# Patient Record
Sex: FEMALE | Race: WHITE | NOT HISPANIC OR LATINO | Employment: FULL TIME | URBAN - METROPOLITAN AREA
[De-identification: names, ages, dates, MRNs, and addresses within clinical notes are randomized per-mention and may not be internally consistent; named-entity substitution may affect disease eponyms.]

---

## 2017-09-19 PROBLEM — O99.891 BACK PAIN IN PREGNANCY: Status: ACTIVE | Noted: 2017-09-19

## 2017-09-19 PROBLEM — M54.9 BACK PAIN IN PREGNANCY: Status: ACTIVE | Noted: 2017-09-19

## 2017-11-13 ENCOUNTER — HOSPITAL ENCOUNTER (OUTPATIENT)
Facility: HOSPITAL | Age: 22
Discharge: HOME | End: 2017-11-13
Attending: OBSTETRICS & GYNECOLOGY | Admitting: OBSTETRICS & GYNECOLOGY
Payer: COMMERCIAL

## 2017-11-13 VITALS
HEIGHT: 60 IN | SYSTOLIC BLOOD PRESSURE: 96 MMHG | OXYGEN SATURATION: 98 % | DIASTOLIC BLOOD PRESSURE: 50 MMHG | HEART RATE: 78 BPM | TEMPERATURE: 99 F | RESPIRATION RATE: 18 BRPM

## 2017-11-13 DIAGNOSIS — G44.219 EPISODIC TENSION-TYPE HEADACHE, NOT INTRACTABLE: ICD-10-CM

## 2017-11-13 DIAGNOSIS — R51.9 HEADACHE: ICD-10-CM

## 2017-11-13 LAB
ABO + RH BLD: NORMAL
AMPHET+METHAMPHET UR QL: NEGATIVE
BARBITURATES UR QL SCN>200 NG/ML: NEGATIVE
BASOPHILS # BLD AUTO: 0.01 K/UL
BASOPHILS NFR BLD: 0.1 %
BENZODIAZ UR QL SCN>200 NG/ML: NEGATIVE
BLD GP AB SCN CELLS X3 SERPL QL: NORMAL
BZE UR QL SCN: NEGATIVE
CANNABINOIDS UR QL SCN: NEGATIVE
CREAT UR-MCNC: 42.3 MG/DL
DIFFERENTIAL METHOD: ABNORMAL
EOSINOPHIL # BLD AUTO: 0 K/UL
EOSINOPHIL NFR BLD: 0.4 %
ERYTHROCYTE [DISTWIDTH] IN BLOOD BY AUTOMATED COUNT: 15 %
HCT VFR BLD AUTO: 32.7 %
HGB BLD-MCNC: 10.4 G/DL
HIV1+2 IGG SERPL QL IA.RAPID: NEGATIVE
LYMPHOCYTES # BLD AUTO: 2.3 K/UL
LYMPHOCYTES NFR BLD: 23.9 %
MCH RBC QN AUTO: 26.1 PG
MCHC RBC AUTO-ENTMCNC: 31.8 G/DL
MCV RBC AUTO: 82 FL
METHADONE UR QL SCN>300 NG/ML: NEGATIVE
MONOCYTES # BLD AUTO: 0.5 K/UL
MONOCYTES NFR BLD: 5.1 %
NEUTROPHILS # BLD AUTO: 6.7 K/UL
NEUTROPHILS NFR BLD: 71.7 %
OPIATES UR QL SCN: NEGATIVE
PCP UR QL SCN>25 NG/ML: NEGATIVE
PLATELET # BLD AUTO: 239 K/UL
PMV BLD AUTO: 11.3 FL
RBC # BLD AUTO: 3.98 M/UL
TOXICOLOGY INFORMATION: NORMAL
WBC # BLD AUTO: 9.47 K/UL

## 2017-11-13 PROCEDURE — 87081 CULTURE SCREEN ONLY: CPT

## 2017-11-13 PROCEDURE — 86850 RBC ANTIBODY SCREEN: CPT

## 2017-11-13 PROCEDURE — 87340 HEPATITIS B SURFACE AG IA: CPT

## 2017-11-13 PROCEDURE — 59025 FETAL NON-STRESS TEST: CPT

## 2017-11-13 PROCEDURE — 63600175 PHARM REV CODE 636 W HCPCS: Performed by: ADVANCED PRACTICE MIDWIFE

## 2017-11-13 PROCEDURE — 99213 OFFICE O/P EST LOW 20 MIN: CPT | Mod: ,,, | Performed by: ADVANCED PRACTICE MIDWIFE

## 2017-11-13 PROCEDURE — 85025 COMPLETE CBC W/AUTO DIFF WBC: CPT

## 2017-11-13 PROCEDURE — 87591 N.GONORRHOEAE DNA AMP PROB: CPT

## 2017-11-13 PROCEDURE — 86592 SYPHILIS TEST NON-TREP QUAL: CPT

## 2017-11-13 PROCEDURE — 96372 THER/PROPH/DIAG INJ SC/IM: CPT

## 2017-11-13 PROCEDURE — 86703 HIV-1/HIV-2 1 RESULT ANTBDY: CPT

## 2017-11-13 PROCEDURE — 86900 BLOOD TYPING SEROLOGIC ABO: CPT

## 2017-11-13 PROCEDURE — 99211 OFF/OP EST MAY X REQ PHY/QHP: CPT | Mod: 25

## 2017-11-13 PROCEDURE — 80307 DRUG TEST PRSMV CHEM ANLYZR: CPT

## 2017-11-13 PROCEDURE — 86762 RUBELLA ANTIBODY: CPT

## 2017-11-13 PROCEDURE — 25000003 PHARM REV CODE 250: Performed by: ADVANCED PRACTICE MIDWIFE

## 2017-11-13 RX ORDER — ONDANSETRON 8 MG/1
8 TABLET, ORALLY DISINTEGRATING ORAL EVERY 8 HOURS PRN
Status: DISCONTINUED | OUTPATIENT
Start: 2017-11-13 | End: 2017-11-14 | Stop reason: HOSPADM

## 2017-11-13 RX ORDER — HYDROCODONE BITARTRATE AND ACETAMINOPHEN 7.5; 325 MG/1; MG/1
1 TABLET ORAL EVERY 6 HOURS PRN
Status: DISCONTINUED | OUTPATIENT
Start: 2017-11-13 | End: 2017-11-14 | Stop reason: HOSPADM

## 2017-11-13 RX ORDER — DIPHENHYDRAMINE HCL 25 MG
25 CAPSULE ORAL EVERY 6 HOURS PRN
Status: DISCONTINUED | OUTPATIENT
Start: 2017-11-13 | End: 2017-11-14 | Stop reason: HOSPADM

## 2017-11-13 RX ORDER — PROMETHAZINE HYDROCHLORIDE 25 MG/ML
12.5 INJECTION, SOLUTION INTRAMUSCULAR; INTRAVENOUS ONCE
Status: COMPLETED | OUTPATIENT
Start: 2017-11-13 | End: 2017-11-13

## 2017-11-13 RX ORDER — MORPHINE SULFATE 10 MG/ML
10 INJECTION INTRAMUSCULAR; INTRAVENOUS; SUBCUTANEOUS ONCE
Status: COMPLETED | OUTPATIENT
Start: 2017-11-13 | End: 2017-11-13

## 2017-11-13 RX ORDER — ACETAMINOPHEN 500 MG
500 TABLET ORAL EVERY 6 HOURS PRN
Status: DISCONTINUED | OUTPATIENT
Start: 2017-11-13 | End: 2017-11-14 | Stop reason: HOSPADM

## 2017-11-13 RX ORDER — BUTALBITAL, ACETAMINOPHEN AND CAFFEINE 50; 325; 40 MG/1; MG/1; MG/1
1 TABLET ORAL EVERY 4 HOURS PRN
Status: DISCONTINUED | OUTPATIENT
Start: 2017-11-13 | End: 2017-11-14 | Stop reason: HOSPADM

## 2017-11-13 RX ADMIN — MORPHINE SULFATE 10 MG: 10 INJECTION, SOLUTION INTRAMUSCULAR; INTRAVENOUS at 10:11

## 2017-11-13 RX ADMIN — HYDROCODONE BITARTRATE AND ACETAMINOPHEN 1 TABLET: 7.5; 325 TABLET ORAL at 09:11

## 2017-11-13 RX ADMIN — ONDANSETRON 8 MG: 8 TABLET, ORALLY DISINTEGRATING ORAL at 10:11

## 2017-11-13 RX ADMIN — PROMETHAZINE HYDROCHLORIDE 12.5 MG: 25 INJECTION INTRAMUSCULAR; INTRAVENOUS at 07:11

## 2017-11-13 RX ADMIN — DIPHENHYDRAMINE HYDROCHLORIDE 25 MG: 25 CAPSULE ORAL at 10:11

## 2017-11-13 RX ADMIN — BUTALBITAL, ACETAMINOPHEN, AND CAFFEINE 1 TABLET: 50; 325; 40 TABLET ORAL at 07:11

## 2017-11-14 LAB
C TRACH DNA SPEC QL NAA+PROBE: NOT DETECTED
N GONORRHOEA DNA SPEC QL NAA+PROBE: NOT DETECTED

## 2017-11-14 NOTE — H&P
[facility] -   Obstetrics  History & Physical    Patient Name: Lizbeth Barbosa  MRN: [id number]  Admission Date: 2017  Primary Care Provider: Susie Kim MD    Subjective:     Principal Problem:<principal problem not specified>    History of Present Illness:   IUP at 35w6d by sono done in [facility] ER  No prenatal care this pregnancy    Obstetric HPI:  Patient reports None contractions, active fetal movement, No vaginal bleeding , No loss of fluid     This pregnancy has been complicated by absent prenatal care, back pain    Obstetric History       T1      L3     SAB1   TAB0   Ectopic0   Multiple0   Live Births3       # Outcome Date GA Lbr Mendoza/2nd Weight Sex Delivery Anes PTL Lv   4 Current            3 Term 14 40w5d  3.711 kg (8 lb 2.9 oz)   EPI N SEBASTIAN      Complications: , delivered      Apgar1:  8                Apgar5: 9   2 SAB 14 8w0d       DEC   1 Term 13 38w0d  2.863 kg (6 lb 5 oz) F CS-Unspec   SEBASTIAN        Past Medical History:   Diagnosis Date    ADHD (attention deficit hyperactivity disorder)     Anemia 10/17/2014     Past Surgical History:   Procedure Laterality Date    adnoids       SECTION      x 1    TONSILLECTOMY      tonsils         No prescriptions prior to admission.       Review of patient's allergies indicates:   Allergen Reactions    Latex, natural rubber Rash        Family History     Problem Relation (Age of Onset)    ADD / ADHD Sister, Sister    No Known Problems Mother, Father, Daughter, Sister, Daughter        Social History Main Topics    Smoking status: Current Every Day Smoker     Packs/day: 1.00     Years: 2.00     Types: Cigarettes    Smokeless tobacco: Never Used    Alcohol use Yes      Comment: occ    Drug use: No    Sexual activity: Yes     Partners: Male     Birth control/ protection: OCP     Review of Systems   Constitutional: Negative.    HENT: Negative.    Eyes: Positive for visual  disturbance.   Respiratory: Negative.    Cardiovascular: Negative.    Gastrointestinal: Negative.    Endocrine: Negative.    Genitourinary: Negative.    Musculoskeletal: Negative.    Skin:  Negative.   Neurological: Positive for headaches.   Hematological: Negative.    Psychiatric/Behavioral: Negative.    Breast: negative.    All other systems reviewed and are negative.     Objective:     Vital Signs (Most Recent):    Vital Signs (24h Range):           There is no height or weight on file to calculate BMI.    FHT: Cat 1 (reassuring)  TOCO:  Q 30 minutes    Physical Exam:   Constitutional: She is oriented to person, place, and time. She appears well-developed and well-nourished.     Eyes: Conjunctivae are normal. Pupils are equal, round, and reactive to light.    Neck: Normal range of motion.    Cardiovascular: Normal rate and regular rhythm.     Pulmonary/Chest: Breath sounds normal.        Abdominal: Soft.     Genitourinary: Vagina normal and uterus normal.           Musculoskeletal: Normal range of motion and moves all extremeties.       Neurological: She is alert and oriented to person, place, and time.    Skin: Skin is warm.    Psychiatric: She has a normal mood and affect. Her behavior is normal. Thought content normal.       Cervix:  Dilation:  1  Effacement:  50%  Station: -2  Presentation: Vertex     Significant Labs:  Lab Results   Component Value Date    GROUPTRH O POS 2014    HEPBSAG Negative 2014    STREPBCULT STREPTOCOCCUS AGALACTIAE (GROUP B) 10/30/2014       I have personallly reviewed all pertinent lab results from the last 24 hours.    Assessment/Plan:     22 y.o. female  at 35w6d for:    Headache    Prenatal labs ordered  GBS and genprobe collected  No prenatal care  P medicate with fioricet  observe            Maira Toure CNM  Obstetrics  [facility]

## 2017-11-14 NOTE — ASSESSMENT & PLAN NOTE
Prenatal labs ordered  GBS and genprobe collected  No prenatal care  P medicated with fioricet, norco and morphine  Will discharge home  Prenatal NOB appointment this week

## 2017-11-14 NOTE — SUBJECTIVE & OBJECTIVE
Obstetric HPI:  Patient reports None contractions, active fetal movement, No vaginal bleeding , No loss of fluid     This pregnancy has been complicated by absent prenatal care, back pain    Obstetric History       T1      L3     SAB1   TAB0   Ectopic0   Multiple0   Live Births3       # Outcome Date GA Lbr Mendoza/2nd Weight Sex Delivery Anes PTL Lv   4 Current            3 Term 14 40w5d  3.711 kg (8 lb 2.9 oz)   EPI N SEBASTIAN      Complications: , delivered      Apgar1:  8                Apgar5: 9   2 SAB 14 8w0d       DEC   1 Term 13 38w0d  2.863 kg (6 lb 5 oz) F CS-Unspec   SEBASTIAN        Past Medical History:   Diagnosis Date    ADHD (attention deficit hyperactivity disorder)     Anemia 10/17/2014     Past Surgical History:   Procedure Laterality Date    adnoids       SECTION      x 1    TONSILLECTOMY      tonsils         No prescriptions prior to admission.       Review of patient's allergies indicates:   Allergen Reactions    Latex, natural rubber Rash        Family History     Problem Relation (Age of Onset)    ADD / ADHD Sister, Sister    No Known Problems Mother, Father, Daughter, Sister, Daughter        Social History Main Topics    Smoking status: Current Every Day Smoker     Packs/day: 1.00     Years: 2.00     Types: Cigarettes    Smokeless tobacco: Never Used    Alcohol use Yes      Comment: occ    Drug use: No    Sexual activity: Yes     Partners: Male     Birth control/ protection: OCP     Review of Systems   Constitutional: Negative.    HENT: Negative.    Eyes: Positive for visual disturbance.   Respiratory: Negative.    Cardiovascular: Negative.    Gastrointestinal: Negative.    Endocrine: Negative.    Genitourinary: Negative.    Musculoskeletal: Negative.    Skin:  Negative.   Neurological: Positive for headaches.   Hematological: Negative.    Psychiatric/Behavioral: Negative.    Breast: negative.    All other systems reviewed and are negative.      Objective:     Vital Signs (Most Recent):    Vital Signs (24h Range):           There is no height or weight on file to calculate BMI.    FHT: Cat 1 (reassuring)  TOCO:  Q 30 minutes    Physical Exam:   Constitutional: She is oriented to person, place, and time. She appears well-developed and well-nourished.     Eyes: Conjunctivae are normal. Pupils are equal, round, and reactive to light.    Neck: Normal range of motion.    Cardiovascular: Normal rate and regular rhythm.     Pulmonary/Chest: Breath sounds normal.        Abdominal: Soft.     Genitourinary: Vagina normal and uterus normal.           Musculoskeletal: Normal range of motion and moves all extremeties.       Neurological: She is alert and oriented to person, place, and time.    Skin: Skin is warm.    Psychiatric: She has a normal mood and affect. Her behavior is normal. Thought content normal.       Cervix:  Dilation:  1  Effacement:  50%  Station: -2  Presentation: Vertex     Significant Labs:  Lab Results   Component Value Date    GROUPTRH O POS 12/04/2014    HEPBSAG Negative 04/17/2014    STREPBCULT STREPTOCOCCUS AGALACTIAE (GROUP B) 10/30/2014       I have personallly reviewed all pertinent lab results from the last 24 hours.

## 2017-11-14 NOTE — ASSESSMENT & PLAN NOTE
Prenatal labs ordered  GBS and genprobe collected  No prenatal care  P medicate with fioricet  observe

## 2017-11-14 NOTE — NURSING
Gave patient AVS and educated on  discharge information. Educated on nutrition, hydration, home medications, fetal kick counts, activity, s/s of OB emergencies, and reasons to notify OB provider (including vaginal bleeding like a period, rupture of membranes, constant and strong abdominal pain or tenderness that does not go away, contractions every 3-5 min for 1-2 hours that are increasing in strength, changes in urination such as hematuria/oliguria/dysuria/urgency/frequency, temp greater than 100.4 F). Patient verbalized understanding.    Gave phone number for clinic. Instructed to call in the morning to schedule appointment. Needs to establish care as a new OB. Verbalized understanding     Gave phone number for L&D for any further questions.

## 2017-11-14 NOTE — DISCHARGE SUMMARY
[facility]  Obstetrics  Discharge Summary      Patient Name: Lizbeth Barbosa  MRN: [id number]  Admission Date: 2017  Hospital Length of Stay: 0 days  Discharge Date and Time:  2017 10:25 PM  Attending Physician: Jodee Velasquez MD   Discharging Provider: Maira Toure CNM  Primary Care Provider: Susie Kim MD    HPI:  IUP at 35w6d by sono done in [facility]  No prenatal care this pregnancy    * No surgery found *     Hospital Course:   Presents c/o headache, itching and blurred vision        Final Active Diagnoses:    Diagnosis Date Noted POA    PRINCIPAL PROBLEM:  Headache [R51] 2017 Yes      Problems Resolved During this Admission:    Diagnosis Date Noted Date Resolved POA        Labs:   CBC   Recent Labs  Lab 17   WBC 9.47   HGB 10.4*   HCT 32.7*          Feeding Method: breast    Immunizations     None          This patient has no babies on file.  Pending Diagnostic Studies:     Procedure Component Value Units Date/Time    Hepatitis B Surface Antigen [[id number]] Collected:  17    Order Status:  Sent Lab Status:  In process Updated:  17    Specimen:  Blood from Blood     RPR [[id number]] Collected:  17    Order Status:  Sent Lab Status:  In process Updated:  17    Specimen:  Blood from Blood     Rubella antibody, IgG [[id number]] Collected:  17    Order Status:  Sent Lab Status:  In process Updated:  17    Specimen:  Blood from Blood           Discharged Condition: good    Disposition: Home or Self Care    Follow Up:  Follow-up Information     Follow up In 1 week.               Patient Instructions:     Diet general     Activity as tolerated       Medications:  There are no discharge medications for this patient.      Maira Toure CNM  Obstetrics  [facility]

## 2017-11-15 ENCOUNTER — INITIAL PRENATAL (OUTPATIENT)
Dept: OBSTETRICS AND GYNECOLOGY | Facility: CLINIC | Age: 22
End: 2017-11-15
Payer: COMMERCIAL

## 2017-11-15 VITALS
WEIGHT: 185.63 LBS | SYSTOLIC BLOOD PRESSURE: 102 MMHG | BODY MASS INDEX: 36.25 KG/M2 | DIASTOLIC BLOOD PRESSURE: 78 MMHG

## 2017-11-15 DIAGNOSIS — Z34.93 NORMAL PREGNANCY IN THIRD TRIMESTER: ICD-10-CM

## 2017-11-15 DIAGNOSIS — O09.33 INSUFFICIENT PRENATAL CARE IN THIRD TRIMESTER: Primary | ICD-10-CM

## 2017-11-15 DIAGNOSIS — Z98.891 PREVIOUS CESAREAN SECTION: ICD-10-CM

## 2017-11-15 LAB
HBV SURFACE AG SERPL QL IA: NEGATIVE
RPR SER QL: NORMAL

## 2017-11-15 PROCEDURE — 99999 PR PBB SHADOW E&M-EST. PATIENT-LVL II: CPT | Mod: PBBFAC,,, | Performed by: ADVANCED PRACTICE MIDWIFE

## 2017-11-15 PROCEDURE — 0500F INITIAL PRENATAL CARE VISIT: CPT | Mod: S$GLB,,, | Performed by: ADVANCED PRACTICE MIDWIFE

## 2017-11-15 RX ORDER — BUTALBITAL, ACETAMINOPHEN AND CAFFEINE 50; 325; 40 MG/1; MG/1; MG/1
1 TABLET ORAL EVERY 4 HOURS PRN
Qty: 30 TABLET | Refills: 1 | Status: SHIPPED | OUTPATIENT
Start: 2017-11-15 | End: 2017-12-15

## 2017-11-15 NOTE — PROGRESS NOTES
No prenatal care until ER visit Had sono x2 on at gender reveal in [facility] and one at ER in [facility]  Labs all done in triage will get sono next week  Has had headache on and off for 3 days responded to benadryl and morphine  Will rx fioricet, zyrtec  Oriented to practice   Coffective counseling sheet Fall In Love discussed with mother. Reinforced immediate skin to skin, the magic first hour, importance of the first feeding and delaying routine procedures. Encouraged mother to download Coffective mobile marely if she has not already done so. Mother verbalizes understanding.  Coffective counseling sheet Protect Breastfeeding discussed with mother. Reinforced avoidence of artifical nipples and formula, unless medically indicated.  Encouraged mother to download Coffective mobile marely if she has not already done so. Mother verbalizes understanding.

## 2017-11-17 LAB — BACTERIA SPEC AEROBE CULT: NORMAL

## 2017-11-20 ENCOUNTER — PROCEDURE VISIT (OUTPATIENT)
Dept: OBSTETRICS AND GYNECOLOGY | Facility: CLINIC | Age: 22
End: 2017-11-20
Payer: COMMERCIAL

## 2017-11-20 ENCOUNTER — ROUTINE PRENATAL (OUTPATIENT)
Dept: OBSTETRICS AND GYNECOLOGY | Facility: CLINIC | Age: 22
End: 2017-11-20
Payer: COMMERCIAL

## 2017-11-20 VITALS
SYSTOLIC BLOOD PRESSURE: 124 MMHG | WEIGHT: 185.19 LBS | DIASTOLIC BLOOD PRESSURE: 68 MMHG | BODY MASS INDEX: 36.17 KG/M2

## 2017-11-20 DIAGNOSIS — Z34.93 NORMAL PREGNANCY IN THIRD TRIMESTER: Primary | ICD-10-CM

## 2017-11-20 DIAGNOSIS — O09.33 INSUFFICIENT PRENATAL CARE IN THIRD TRIMESTER: ICD-10-CM

## 2017-11-20 PROCEDURE — 76819 FETAL BIOPHYS PROFIL W/O NST: CPT | Mod: S$GLB,,, | Performed by: OBSTETRICS & GYNECOLOGY

## 2017-11-20 PROCEDURE — 99999 PR PBB SHADOW E&M-EST. PATIENT-LVL II: CPT | Mod: PBBFAC,,, | Performed by: ADVANCED PRACTICE MIDWIFE

## 2017-11-20 PROCEDURE — 76816 OB US FOLLOW-UP PER FETUS: CPT | Mod: S$GLB,,, | Performed by: OBSTETRICS & GYNECOLOGY

## 2017-11-20 PROCEDURE — 0502F SUBSEQUENT PRENATAL CARE: CPT | Mod: S$GLB,,, | Performed by: ADVANCED PRACTICE MIDWIFE

## 2017-11-20 NOTE — PROGRESS NOTES
sono shows EDC 12/22/17 but had early sono and sure of LMP will leave due date of 12/12/17  EFW 6#7oz  MVP 4  Kick counts

## 2017-11-21 LAB — MAYO MISCELLANEOUS RESULT (REF): NORMAL

## 2017-11-24 LAB
RUBV IGG SER-ACNC: NORMAL IU/ML
RUBV IGG SER-IMP: NORMAL

## 2017-12-04 ENCOUNTER — ROUTINE PRENATAL (OUTPATIENT)
Dept: OBSTETRICS AND GYNECOLOGY | Facility: CLINIC | Age: 22
End: 2017-12-04
Payer: COMMERCIAL

## 2017-12-04 VITALS — DIASTOLIC BLOOD PRESSURE: 62 MMHG | WEIGHT: 186.5 LBS | SYSTOLIC BLOOD PRESSURE: 130 MMHG | BODY MASS INDEX: 36.43 KG/M2

## 2017-12-04 DIAGNOSIS — Z34.93 NORMAL PREGNANCY IN THIRD TRIMESTER: Primary | ICD-10-CM

## 2017-12-04 PROCEDURE — 99999 PR PBB SHADOW E&M-EST. PATIENT-LVL II: CPT | Mod: PBBFAC,,, | Performed by: ADVANCED PRACTICE MIDWIFE

## 2017-12-04 PROCEDURE — 0502F SUBSEQUENT PRENATAL CARE: CPT | Mod: S$GLB,,, | Performed by: ADVANCED PRACTICE MIDWIFE

## 2017-12-11 ENCOUNTER — ROUTINE PRENATAL (OUTPATIENT)
Dept: OBSTETRICS AND GYNECOLOGY | Facility: CLINIC | Age: 22
End: 2017-12-11
Payer: COMMERCIAL

## 2017-12-11 VITALS — BODY MASS INDEX: 37.2 KG/M2 | SYSTOLIC BLOOD PRESSURE: 122 MMHG | DIASTOLIC BLOOD PRESSURE: 68 MMHG | WEIGHT: 190.5 LBS

## 2017-12-11 DIAGNOSIS — Z34.93 NORMAL PREGNANCY IN THIRD TRIMESTER: Primary | ICD-10-CM

## 2017-12-11 DIAGNOSIS — Z98.891 PREVIOUS CESAREAN SECTION: ICD-10-CM

## 2017-12-11 PROCEDURE — 99999 PR PBB SHADOW E&M-EST. PATIENT-LVL II: CPT | Mod: PBBFAC,,, | Performed by: ADVANCED PRACTICE MIDWIFE

## 2017-12-11 PROCEDURE — 0502F SUBSEQUENT PRENATAL CARE: CPT | Mod: S$GLB,,, | Performed by: ADVANCED PRACTICE MIDWIFE

## 2017-12-12 ENCOUNTER — HOSPITAL ENCOUNTER (INPATIENT)
Facility: HOSPITAL | Age: 22
LOS: 2 days | Discharge: HOME | End: 2017-12-14
Attending: OBSTETRICS & GYNECOLOGY | Admitting: OBSTETRICS & GYNECOLOGY
Payer: COMMERCIAL

## 2017-12-12 ENCOUNTER — ANESTHESIA (OUTPATIENT)
Dept: OBSTETRICS AND GYNECOLOGY | Facility: HOSPITAL | Age: 22
End: 2017-12-12
Payer: COMMERCIAL

## 2017-12-12 ENCOUNTER — ANESTHESIA EVENT (OUTPATIENT)
Dept: OBSTETRICS AND GYNECOLOGY | Facility: HOSPITAL | Age: 22
End: 2017-12-12
Payer: COMMERCIAL

## 2017-12-12 DIAGNOSIS — Z37.9 NORMAL LABOR: ICD-10-CM

## 2017-12-12 DIAGNOSIS — O34.219 VBAC, DELIVERED, CURRENT HOSPITALIZATION: ICD-10-CM

## 2017-12-12 DIAGNOSIS — O47.9 IRREGULAR UTERINE CONTRACTIONS: ICD-10-CM

## 2017-12-12 PROBLEM — O09.30 LATE PRENATAL CARE: Status: RESOLVED | Noted: 2017-12-12 | Resolved: 2017-12-12

## 2017-12-12 PROBLEM — M54.9 BACK PAIN IN PREGNANCY: Status: RESOLVED | Noted: 2017-09-19 | Resolved: 2017-12-12

## 2017-12-12 PROBLEM — O09.30 LATE PRENATAL CARE: Status: ACTIVE | Noted: 2017-12-12

## 2017-12-12 PROBLEM — R51.9 HEADACHE: Status: RESOLVED | Noted: 2017-11-13 | Resolved: 2017-12-12

## 2017-12-12 PROBLEM — O99.891 BACK PAIN IN PREGNANCY: Status: RESOLVED | Noted: 2017-09-19 | Resolved: 2017-12-12

## 2017-12-12 LAB
ABO + RH BLD: NORMAL
BASOPHILS # BLD AUTO: 0.01 K/UL
BASOPHILS NFR BLD: 0.1 %
BLD GP AB SCN CELLS X3 SERPL QL: NORMAL
DIFFERENTIAL METHOD: ABNORMAL
EOSINOPHIL # BLD AUTO: 0.1 K/UL
EOSINOPHIL NFR BLD: 0.6 %
ERYTHROCYTE [DISTWIDTH] IN BLOOD BY AUTOMATED COUNT: 16.3 %
HCT VFR BLD AUTO: 31.3 %
HGB BLD-MCNC: 9.5 G/DL
LYMPHOCYTES # BLD AUTO: 2.5 K/UL
LYMPHOCYTES NFR BLD: 19.5 %
MCH RBC QN AUTO: 24.7 PG
MCHC RBC AUTO-ENTMCNC: 30.4 G/DL
MCV RBC AUTO: 81 FL
MONOCYTES # BLD AUTO: 0.7 K/UL
MONOCYTES NFR BLD: 5.8 %
NEUTROPHILS # BLD AUTO: 9.4 K/UL
NEUTROPHILS NFR BLD: 74.5 %
PLATELET # BLD AUTO: 179 K/UL
PMV BLD AUTO: 12.7 FL
RBC # BLD AUTO: 3.85 M/UL
WBC # BLD AUTO: 12.65 K/UL

## 2017-12-12 PROCEDURE — 0HQ9XZZ REPAIR PERINEUM SKIN, EXTERNAL APPROACH: ICD-10-PCS | Performed by: OBSTETRICS & GYNECOLOGY

## 2017-12-12 PROCEDURE — 63600175 PHARM REV CODE 636 W HCPCS: Performed by: NURSE ANESTHETIST, CERTIFIED REGISTERED

## 2017-12-12 PROCEDURE — 86850 RBC ANTIBODY SCREEN: CPT

## 2017-12-12 PROCEDURE — 11000001 HC ACUTE MED/SURG PRIVATE ROOM

## 2017-12-12 PROCEDURE — 25000003 PHARM REV CODE 250: Performed by: MIDWIFE

## 2017-12-12 PROCEDURE — 25000003 PHARM REV CODE 250: Performed by: ADVANCED PRACTICE MIDWIFE

## 2017-12-12 PROCEDURE — 99211 OFF/OP EST MAY X REQ PHY/QHP: CPT | Mod: 25

## 2017-12-12 PROCEDURE — 96361 HYDRATE IV INFUSION ADD-ON: CPT

## 2017-12-12 PROCEDURE — 62326 NJX INTERLAMINAR LMBR/SAC: CPT | Performed by: NURSE ANESTHETIST, CERTIFIED REGISTERED

## 2017-12-12 PROCEDURE — 72200005 HC VAGINAL DELIVERY LEVEL II

## 2017-12-12 PROCEDURE — 25000003 PHARM REV CODE 250: Performed by: NURSE ANESTHETIST, CERTIFIED REGISTERED

## 2017-12-12 PROCEDURE — 72100002 HC LABOR CARE, 1ST 8 HOURS

## 2017-12-12 PROCEDURE — 59025 FETAL NON-STRESS TEST: CPT

## 2017-12-12 PROCEDURE — 63600175 PHARM REV CODE 636 W HCPCS: Performed by: MIDWIFE

## 2017-12-12 PROCEDURE — 51701 INSERT BLADDER CATHETER: CPT

## 2017-12-12 PROCEDURE — 96374 THER/PROPH/DIAG INJ IV PUSH: CPT

## 2017-12-12 PROCEDURE — 96360 HYDRATION IV INFUSION INIT: CPT

## 2017-12-12 PROCEDURE — 10907ZC DRAINAGE OF AMNIOTIC FLUID, THERAPEUTIC FROM PRODUCTS OF CONCEPTION, VIA NATURAL OR ARTIFICIAL OPENING: ICD-10-PCS | Performed by: OBSTETRICS & GYNECOLOGY

## 2017-12-12 PROCEDURE — 85025 COMPLETE CBC W/AUTO DIFF WBC: CPT

## 2017-12-12 PROCEDURE — 27800517 HC TRAY,EPIDURAL-CONTINUOUS: Performed by: NURSE ANESTHETIST, CERTIFIED REGISTERED

## 2017-12-12 PROCEDURE — 86900 BLOOD TYPING SEROLOGIC ABO: CPT

## 2017-12-12 RX ORDER — HYDROCODONE BITARTRATE AND ACETAMINOPHEN 5; 325 MG/1; MG/1
1 TABLET ORAL EVERY 4 HOURS PRN
Status: DISCONTINUED | OUTPATIENT
Start: 2017-12-12 | End: 2017-12-14 | Stop reason: HOSPADM

## 2017-12-12 RX ORDER — HETASTARCH 6 G/100ML
500 INJECTION, SOLUTION INTRAVENOUS ONCE
Status: COMPLETED | OUTPATIENT
Start: 2017-12-12 | End: 2017-12-12

## 2017-12-12 RX ORDER — ONDANSETRON 8 MG/1
8 TABLET, ORALLY DISINTEGRATING ORAL EVERY 8 HOURS PRN
Status: DISCONTINUED | OUTPATIENT
Start: 2017-12-12 | End: 2017-12-12

## 2017-12-12 RX ORDER — ONDANSETRON 4 MG/1
4 TABLET, ORALLY DISINTEGRATING ORAL ONCE
Status: DISCONTINUED | OUTPATIENT
Start: 2017-12-12 | End: 2017-12-12

## 2017-12-12 RX ORDER — ROPIVACAINE IN 0.9% SOD CHL/PF 0.2 %
PLASTIC BAG, INJECTION (ML) EPIDURAL CONTINUOUS
Status: DISCONTINUED | OUTPATIENT
Start: 2017-12-12 | End: 2017-12-14 | Stop reason: HOSPADM

## 2017-12-12 RX ORDER — SODIUM CITRATE AND CITRIC ACID MONOHYDRATE 334; 500 MG/5ML; MG/5ML
30 SOLUTION ORAL ONCE
Status: DISCONTINUED | OUTPATIENT
Start: 2017-12-12 | End: 2017-12-14 | Stop reason: HOSPADM

## 2017-12-12 RX ORDER — ACETAMINOPHEN 325 MG/1
650 TABLET ORAL EVERY 6 HOURS PRN
Status: DISCONTINUED | OUTPATIENT
Start: 2017-12-12 | End: 2017-12-14 | Stop reason: HOSPADM

## 2017-12-12 RX ORDER — ACETAMINOPHEN 500 MG
500 TABLET ORAL EVERY 6 HOURS PRN
Status: DISCONTINUED | OUTPATIENT
Start: 2017-12-12 | End: 2017-12-12

## 2017-12-12 RX ORDER — AMMONIA 15 % (W/V)
0.3 AMPUL (EA) INHALATION CONTINUOUS PRN
Status: DISCONTINUED | OUTPATIENT
Start: 2017-12-12 | End: 2017-12-14 | Stop reason: HOSPADM

## 2017-12-12 RX ORDER — ROPIVACAINE HYDROCHLORIDE 2 MG/ML
INJECTION, SOLUTION EPIDURAL; INFILTRATION; PERINEURAL
Status: DISCONTINUED | OUTPATIENT
Start: 2017-12-12 | End: 2017-12-12

## 2017-12-12 RX ORDER — SODIUM CHLORIDE AND POTASSIUM CHLORIDE 150; 900 MG/100ML; MG/100ML
INJECTION, SOLUTION INTRAVENOUS CONTINUOUS
Status: DISCONTINUED | OUTPATIENT
Start: 2017-12-12 | End: 2017-12-12

## 2017-12-12 RX ORDER — DIPHENHYDRAMINE HCL 25 MG
25 CAPSULE ORAL EVERY 4 HOURS PRN
Status: DISCONTINUED | OUTPATIENT
Start: 2017-12-12 | End: 2017-12-14 | Stop reason: HOSPADM

## 2017-12-12 RX ORDER — ONDANSETRON 8 MG/1
8 TABLET, ORALLY DISINTEGRATING ORAL EVERY 8 HOURS PRN
Status: DISCONTINUED | OUTPATIENT
Start: 2017-12-12 | End: 2017-12-14 | Stop reason: HOSPADM

## 2017-12-12 RX ORDER — SODIUM CHLORIDE, SODIUM LACTATE, POTASSIUM CHLORIDE, CALCIUM CHLORIDE 600; 310; 30; 20 MG/100ML; MG/100ML; MG/100ML; MG/100ML
INJECTION, SOLUTION INTRAVENOUS CONTINUOUS
Status: DISCONTINUED | OUTPATIENT
Start: 2017-12-12 | End: 2017-12-12

## 2017-12-12 RX ORDER — IBUPROFEN 600 MG/1
600 TABLET ORAL EVERY 6 HOURS
Status: DISCONTINUED | OUTPATIENT
Start: 2017-12-12 | End: 2017-12-14 | Stop reason: HOSPADM

## 2017-12-12 RX ORDER — ROPIVACAINE HYDROCHLORIDE 2 MG/ML
INJECTION, SOLUTION EPIDURAL; INFILTRATION; PERINEURAL CONTINUOUS PRN
Status: DISCONTINUED | OUTPATIENT
Start: 2017-12-12 | End: 2017-12-12

## 2017-12-12 RX ORDER — DOCUSATE SODIUM 100 MG/1
100 CAPSULE, LIQUID FILLED ORAL DAILY
Status: DISCONTINUED | OUTPATIENT
Start: 2017-12-13 | End: 2017-12-14 | Stop reason: HOSPADM

## 2017-12-12 RX ORDER — OXYTOCIN/RINGER'S LACTATE 20/1000 ML
99 PLASTIC BAG, INJECTION (ML) INTRAVENOUS CONTINUOUS
Status: DISPENSED | OUTPATIENT
Start: 2017-12-12 | End: 2017-12-13

## 2017-12-12 RX ORDER — BUTORPHANOL TARTRATE 1 MG/ML
1 INJECTION INTRAMUSCULAR; INTRAVENOUS ONCE
Status: COMPLETED | OUTPATIENT
Start: 2017-12-12 | End: 2017-12-12

## 2017-12-12 RX ORDER — FAMOTIDINE 10 MG/ML
20 INJECTION INTRAVENOUS ONCE
Status: DISCONTINUED | OUTPATIENT
Start: 2017-12-12 | End: 2017-12-14 | Stop reason: HOSPADM

## 2017-12-12 RX ORDER — HYDROCORTISONE 25 MG/G
CREAM TOPICAL 3 TIMES DAILY PRN
Status: DISCONTINUED | OUTPATIENT
Start: 2017-12-12 | End: 2017-12-14 | Stop reason: HOSPADM

## 2017-12-12 RX ORDER — LIDOCAINE HYDROCHLORIDE AND EPINEPHRINE 15; 5 MG/ML; UG/ML
INJECTION, SOLUTION EPIDURAL
Status: DISCONTINUED | OUTPATIENT
Start: 2017-12-12 | End: 2017-12-12

## 2017-12-12 RX ADMIN — BUTORPHANOL TARTRATE 1 MG: 1 INJECTION, SOLUTION INTRAMUSCULAR; INTRAVENOUS at 09:12

## 2017-12-12 RX ADMIN — ONDANSETRON 8 MG: 8 TABLET, ORALLY DISINTEGRATING ORAL at 02:12

## 2017-12-12 RX ADMIN — IBUPROFEN 600 MG: 600 TABLET, FILM COATED ORAL at 11:12

## 2017-12-12 RX ADMIN — Medication 333 MILLI-UNITS/MIN: at 05:12

## 2017-12-12 RX ADMIN — IBUPROFEN 600 MG: 600 TABLET, FILM COATED ORAL at 06:12

## 2017-12-12 RX ADMIN — HETASTARCH 250 ML: 6 INJECTION, SOLUTION INTRAVENOUS at 02:12

## 2017-12-12 RX ADMIN — SODIUM CHLORIDE, SODIUM LACTATE, POTASSIUM CHLORIDE, AND CALCIUM CHLORIDE: 600; 310; 30; 20 INJECTION, SOLUTION INTRAVENOUS at 01:12

## 2017-12-12 RX ADMIN — LIDOCAINE HYDROCHLORIDE,EPINEPHRINE BITARTRATE 3 ML: 15; .005 INJECTION, SOLUTION EPIDURAL; INFILTRATION; INTRACAUDAL; PERINEURAL at 12:12

## 2017-12-12 RX ADMIN — ROPIVACAINE HYDROCHLORIDE 12 ML: 2 INJECTION, SOLUTION EPIDURAL; INFILTRATION at 12:12

## 2017-12-12 RX ADMIN — SODIUM CHLORIDE, SODIUM LACTATE, POTASSIUM CHLORIDE, AND CALCIUM CHLORIDE: 600; 310; 30; 20 INJECTION, SOLUTION INTRAVENOUS at 06:12

## 2017-12-12 RX ADMIN — SODIUM CHLORIDE, SODIUM LACTATE, POTASSIUM CHLORIDE, AND CALCIUM CHLORIDE 1000 ML: 600; 310; 30; 20 INJECTION, SOLUTION INTRAVENOUS at 11:12

## 2017-12-12 RX ADMIN — ROPIVACAINE HYDROCHLORIDE 12 ML/HR: 2 INJECTION, SOLUTION EPIDURAL; INFILTRATION at 12:12

## 2017-12-12 RX ADMIN — HYDROCODONE BITARTRATE AND ACETAMINOPHEN 1 TABLET: 5; 325 TABLET ORAL at 11:12

## 2017-12-12 NOTE — PROGRESS NOTES
S:  Comfortable with epidural    O: VS stable, afebrile, 's, Cat 1       Contractions every 2-4 minutes, mod to palpation.       Vag exam:   Cervix 7cm, AROM clear                            Effacement 80%     Station -1   A:  Labor         P:  Expectant management   Ant

## 2017-12-12 NOTE — ANESTHESIA PREPROCEDURE EVALUATION
12/12/2017  Lizbeth Barbosa is a 22 y.o., female.    Pre-op Assessment    I have reviewed the Patient Summary Reports.     I have reviewed the Nursing Notes.   I have reviewed the Medications.     Review of Systems  Anesthesia Hx:  No problems with previous Anesthesia  History of prior surgery of interest to airway management or planning: Previous anesthesia: Epidural, General Denies Family Hx of Anesthesia complications.   Denies Personal Hx of Anesthesia complications.   Social:  Non-Smoker, No Alcohol Use    Hematology/Oncology:     Oncology Normal    -- Anemia:   EENT/Dental:EENT/Dental Normal   Cardiovascular:  Cardiovascular Normal     Pulmonary:  Pulmonary Normal    Renal/:  Renal/ Normal     Hepatic/GI:  Hepatic/GI Normal    Musculoskeletal:  Musculoskeletal Normal    Neurological:   Headaches    Endocrine:  Endocrine Normal    Dermatological:  Skin Normal    Psych:  Psychiatric Normal  ADHD         Physical Exam  General:  Obesity    Airway/Jaw/Neck:  Airway Findings: Mouth Opening: Normal Tongue: Normal  General Airway Assessment: Adult  Mallampati: II  TM Distance: Normal, at least 6 cm  Jaw/Neck Findings:  Neck ROM: Normal ROM      Dental:  Dental Findings: In tact   Chest/Lungs:  Chest/Lungs Findings: Normal Respiratory Rate     Heart/Vascular:  Heart Findings: Rate: Normal        Mental Status:  Mental Status Findings:  Cooperative, Alert and Oriented         Anesthesia Plan  Type of Anesthesia, risks & benefits discussed:  Anesthesia Type:  epidural  Patient's Preference:   Intra-op Monitoring Plan: standard ASA monitors  Intra-op Monitoring Plan Comments:   Post Op Pain Control Plan:   Post Op Pain Control Plan Comments:   Induction:    Beta Blocker:  Patient is not currently on a Beta-Blocker (No further documentation required).       Informed Consent: Patient understands risks and  agrees with Anesthesia plan.  Questions answered. Anesthesia consent signed with patient.  ASA Score: 2     Day of Surgery Review of History & Physical: I have interviewed and examined the patient. I have reviewed the patient's H&P dated: 12/05/2014. There are no significant changes.          Ready For Surgery From Anesthesia Perspective.

## 2017-12-12 NOTE — HOSPITAL COURSE
1217 at [facility], NST, Re-evaluate 1-2 hrs, progressed to   17 routine PP care  17 discharge home today

## 2017-12-12 NOTE — SUBJECTIVE & OBJECTIVE
Obstetric HPI:  Patient reports Date/time of onset: 17 at 1700hrs contractions, active fetal movement, No vaginal bleeding , No loss of fluid     This pregnancy has been complicated by history of C/S and . Late PNC    Obstetric History       T2      L2     SAB1   TAB0   Ectopic0   Multiple0   Live Births2       # Outcome Date GA Lbr Mendoza/2nd Weight Sex Delivery Anes PTL Lv   4 Current            3 Term 14 40w5d  3.711 kg (8 lb 2.9 oz)   EPI N SEBASTIAN      Complications: , delivered      Apgar1:  8                Apgar5: 9   2 SAB 14 8w0d       DEC   1 Term 13 38w0d  2.863 kg (6 lb 5 oz) F CS-Unspec   SEBASTIAN        Past Medical History:   Diagnosis Date    ADHD (attention deficit hyperactivity disorder)     Anemia 10/17/2014     Past Surgical History:   Procedure Laterality Date    adnoids       SECTION      x 1    TONSILLECTOMY      tonsils         PTA Medications   Medication Sig    butalbital-acetaminophen-caffeine -40 mg (FIORICET, ESGIC) -40 mg per tablet Take 1 tablet by mouth every 4 (four) hours as needed for Pain.       Review of patient's allergies indicates:   Allergen Reactions    Latex, natural rubber Rash        Family History     Problem Relation (Age of Onset)    ADD / ADHD Sister, Sister    No Known Problems Mother, Father, Daughter, Sister, Daughter        Social History Main Topics    Smoking status: Current Every Day Smoker     Packs/day: 1.00     Years: 2.00     Types: Cigarettes    Smokeless tobacco: Never Used    Alcohol use Yes      Comment: occ    Drug use: No    Sexual activity: Yes     Partners: Male     Birth control/ protection: OCP     Review of Systems   Constitutional: Negative.    HENT:        Denies headaches or visual disturbances   Respiratory: Negative for shortness of breath.    Cardiovascular: Negative for leg swelling.   Gastrointestinal: Negative for abdominal pain.   Genitourinary: Negative for  dysuria, flank pain, frequency, vaginal bleeding, vaginal discharge and vaginal odor.   Musculoskeletal: Negative for back pain.   Skin:  Negative for rash.   Neurological: Negative for syncope, numbness and headaches.   Psychiatric/Behavioral: Negative for depression.      Objective:     Vital Signs (Most Recent):  Temp: 98.2 °F (36.8 °C) (12/12/17 0541)  Pulse: 93 (12/12/17 0541)  Resp: 20 (12/12/17 0541)  BP: 134/73 (12/12/17 0541) Vital Signs (24h Range):  Temp:  [98.2 °F (36.8 °C)] 98.2 °F (36.8 °C)  Pulse:  [93] 93  Resp:  [20] 20  BP: (122-134)/(68-73) 134/73     Weight: 86.2 kg (190 lb)  Body mass index is 37.11 kg/m².    FHT: 130bpm Cat 1 (reassuring)  TOCO:  Q 5-7 minutes    Physical Exam    Cervix:              Per RN  Dilation:  4  Effacement:  70%  Station: -3  Presentation: Vertex     Significant Labs:  Lab Results   Component Value Date    GROUPTRH O POS 11/13/2017    HEPBSAG Negative 11/13/2017    STREPBCULT No Group B Streptococcus isolated 11/13/2017       I have personallly reviewed all pertinent lab results from the last 24 hours.

## 2017-12-12 NOTE — H&P
[facility] -   Obstetrics  History & Physical    Patient Name: Lizbeth Barbosa  MRN: [id number]  Admission Date: 2017  Primary Care Provider: Susie Kim MD    Subjective:     Principal Problem:Irregular uterine contractions    History of Present Illness:  17 at 0545hrs 22yr old  with IUP 38w3d v's 40w0d- late PNC- C/O ctx's and desiring     Obstetric HPI:  Patient reports Date/time of onset: 17 at 1700hrs contractions, active fetal movement, No vaginal bleeding , No loss of fluid     This pregnancy has been complicated by history of C/S and . Late PNC    Obstetric History       T2      L2     SAB1   TAB0   Ectopic0   Multiple0   Live Births2       # Outcome Date GA Lbr Mendoza/2nd Weight Sex Delivery Anes PTL Lv   4 Current            3 Term 14 40w5d  3.711 kg (8 lb 2.9 oz)   EPI N SEBASTIAN      Complications: , delivered      Apgar1:  8                Apgar5: 9   2 SAB 14 8w0d       DEC   1 Term 13 38w0d  2.863 kg (6 lb 5 oz) F CS-Unspec   SEBASTIAN        Past Medical History:   Diagnosis Date    ADHD (attention deficit hyperactivity disorder)     Anemia 10/17/2014     Past Surgical History:   Procedure Laterality Date    adnoids       SECTION      x 1    TONSILLECTOMY      tonsils         PTA Medications   Medication Sig    butalbital-acetaminophen-caffeine -40 mg (FIORICET, ESGIC) -40 mg per tablet Take 1 tablet by mouth every 4 (four) hours as needed for Pain.       Review of patient's allergies indicates:   Allergen Reactions    Latex, natural rubber Rash        Family History     Problem Relation (Age of Onset)    ADD / ADHD Sister, Sister    No Known Problems Mother, Father, Daughter, Sister, Daughter        Social History Main Topics    Smoking status: Current Every Day Smoker     Packs/day: 1.00     Years: 2.00     Types: Cigarettes    Smokeless tobacco: Never Used    Alcohol use Yes      Comment:  occ    Drug use: No    Sexual activity: Yes     Partners: Male     Birth control/ protection: OCP     Review of Systems   Constitutional: Negative.    HENT:        Denies headaches or visual disturbances   Respiratory: Negative for shortness of breath.    Cardiovascular: Negative for leg swelling.   Gastrointestinal: Negative for abdominal pain.   Genitourinary: Negative for dysuria, flank pain, frequency, vaginal bleeding, vaginal discharge and vaginal odor.   Musculoskeletal: Negative for back pain.   Skin:  Negative for rash.   Neurological: Negative for syncope, numbness and headaches.   Psychiatric/Behavioral: Negative for depression.      Objective:     Vital Signs (Most Recent):  Temp: 98.2 °F (36.8 °C) (17)  Pulse: 93 (17)  Resp: 20 (17)  BP: 134/73 (17) Vital Signs (24h Range):  Temp:  [98.2 °F (36.8 °C)] 98.2 °F (36.8 °C)  Pulse:  [93] 93  Resp:  [20] 20  BP: (122-134)/(68-73) 134/73     Weight: 86.2 kg (190 lb)  Body mass index is 37.11 kg/m².    FHT: 130bpm Cat 1 (reassuring)  TOCO:  Q 5-7 minutes    Physical Exam    Cervix:              Per RN  Dilation:  4  Effacement:  70%  Station: -3  Presentation: Vertex     Significant Labs:  Lab Results   Component Value Date    GROUPTRH O POS 2017    HEPBSAG Negative 2017    STREPBCULT No Group B Streptococcus isolated 2017       I have personallly reviewed all pertinent lab results from the last 24 hours.    Assessment/Plan:     22 y.o. female  at 40w0d for:    * Irregular uterine contractions    NST, Observe S/S labor progress, Inform MD as desires         Previous  section    History successful             Genny Espinoza CNM  Obstetrics  [facility]

## 2017-12-12 NOTE — ANESTHESIA PROCEDURE NOTES
Epidural    Patient location during procedure: OB   Reason for block: primary anesthetic   Diagnosis: IUP with labor pain   Start time: 12/12/2017 12:25 PM  Timeout: 12/12/2017 12:23 PM  Staffing  Anesthesiologist: JOHN AMAYA  Resident/CRNA: OLEG HALLMAN  Performed: anesthesiologist   Preanesthetic Checklist  Completed: patient identified, pre-op evaluation, timeout performed, IV checked, risks and benefits discussed, monitors and equipment checked, anesthesia consent given, hand hygiene performed and patient being monitored  Preparation  Patient position: sitting  Prep: Betadine  Patient monitoring: Blood Pressure and Pulse Ox  Epidural  Skin Anesthetic: lidocaine 1%  Skin Wheal: 3 mL  Administration type: continuous  Approach: midline  Interspace: L3-4  Injection technique: DONAVAN saline and DONAVAN air  Needle and Epidural Catheter  Needle type: Social DJohy   Needle gauge: 18  Needle length: 3.5 inches  Needle insertion depth: 7 cm  Catheter type: multi-orifice  Catheter size: 20 G  Catheter at skin depth: 12 cm  Test dose: 3 mL of lidocaine 1.5% with Epi 1-to-200,000  Additional Documentation: incremental injection, negative aspiration for heme and CSF, no signs/symptoms of IV or SA injection, no paresthesia on injection, no significant pain on injection and no significant complaints from patient  Needle localization: anatomical landmarks  Medications:  Bolus administered: 12 mL of 0.2% ropivacaine  Epinephrine added: none  Assessment  Upper dermatomal levels - Left: T8  Right: T8   Dermatomal levels determined by pinch or prick  Ease of block: easy  Patient's tolerance of the procedure: comfortable throughout block

## 2017-12-12 NOTE — HPI
17 at 0545hrs 22yr old  with IUP 38w3d v's 40w0d- late PNC- C/O ctx's and desiring    17 routine pp care ready for discharge

## 2017-12-13 PROCEDURE — 11000001 HC ACUTE MED/SURG PRIVATE ROOM

## 2017-12-13 PROCEDURE — 99231 SBSQ HOSP IP/OBS SF/LOW 25: CPT | Mod: ,,, | Performed by: ADVANCED PRACTICE MIDWIFE

## 2017-12-13 PROCEDURE — 25000003 PHARM REV CODE 250: Performed by: MIDWIFE

## 2017-12-13 RX ADMIN — IBUPROFEN 600 MG: 600 TABLET, FILM COATED ORAL at 11:12

## 2017-12-13 RX ADMIN — HYDROCODONE BITARTRATE AND ACETAMINOPHEN 1 TABLET: 5; 325 TABLET ORAL at 07:12

## 2017-12-13 RX ADMIN — DOCUSATE SODIUM 100 MG: 100 CAPSULE, LIQUID FILLED ORAL at 09:12

## 2017-12-13 RX ADMIN — IBUPROFEN 600 MG: 600 TABLET, FILM COATED ORAL at 05:12

## 2017-12-13 RX ADMIN — HYDROCODONE BITARTRATE AND ACETAMINOPHEN 1 TABLET: 5; 325 TABLET ORAL at 11:12

## 2017-12-13 NOTE — PROGRESS NOTES
[facility] -   Obstetrics  Postpartum Progress Note    Patient Name: Lizbeth Barbosa  MRN: [id number]  Admission Date: 2017  Hospital Length of Stay: 1 days  Attending Physician: Jodee Velasquez MD  Primary Care Provider: Susie Kim MD    Subjective:     Principal Problem:, delivered, current hospitalization    Hospital course: 1217 at 0545hrs Outpatient, NST, Re-evaluate 1-2 hrs, progressed to   17 routine PP care    Interval History: PPD #1    She is doing well this morning. She is tolerating a regular diet without nausea or vomiting. She is voiding spontaneously. She is ambulating. She has not passed flatus, and has not a BM. Vaginal bleeding is mild. She denies fever or chills. Abdominal pain is mild and controlled with oral medications. She is breastfeeding. She desires circumcision for her male baby: not applicable.    Objective:     Vital Signs (Most Recent):  Temp: 98.1 °F (36.7 °C) (17 0800)  Pulse: 71 (17 0800)  Resp: 18 (17 0800)  BP: 129/79 (17 0800)  SpO2: 97 % (17 1805) Vital Signs (24h Range):  Temp:  [97.7 °F (36.5 °C)-98.4 °F (36.9 °C)] 98.1 °F (36.7 °C)  Pulse:  [] 71  Resp:  [18] 18  SpO2:  [97 %-100 %] 97 %  BP: ()/(34-82) 129/79     Weight: 86.2 kg (190 lb)  Body mass index is 37.11 kg/m².      Intake/Output Summary (Last 24 hours) at 17 1007  Last data filed at 17 2230   Gross per 24 hour   Intake          2362.83 ml   Output             1050 ml   Net          1312.83 ml       Significant Labs:  Lab Results   Component Value Date    GROUPTRH O POS 2017    HEPBSAG Negative 2017    STREPBCULT No Group B Streptococcus isolated 2017       Recent Labs  Lab 17  0630   HGB 9.5*   HCT 31.3*       I have personallly reviewed all pertinent lab results from the last 24 hours.    Physical Exam:   Constitutional: She is oriented to person, place, and time. She appears well-developed  and well-nourished.             Abdominal: Soft.   Fundus firm below umbilicus     Genitourinary:   Genitourinary Comments: Small lochia           Musculoskeletal: Normal range of motion and moves all extremeties.       Neurological: She is alert and oriented to person, place, and time.    Skin: Skin is warm and dry.    Psychiatric: She has a normal mood and affect. Her behavior is normal.       Assessment/Plan:     22 y.o. female  for:    Previous  section    History successful ,  17  Routine PP care            Disposition: As patient meets milestones, will plan to discharge tomorrow.    Selena Lu CNM  Obstetrics  [facility]

## 2017-12-13 NOTE — SUBJECTIVE & OBJECTIVE
Hospital course: 1217 at 0545hrs Outpatient, NST, Re-evaluate 1-2 hrs, progressed to   17 routine PP care    Interval History: PPD #1    She is doing well this morning. She is tolerating a regular diet without nausea or vomiting. She is voiding spontaneously. She is ambulating. She has not passed flatus, and has not a BM. Vaginal bleeding is mild. She denies fever or chills. Abdominal pain is mild and controlled with oral medications. She is breastfeeding. She desires circumcision for her male baby: not applicable.    Objective:     Vital Signs (Most Recent):  Temp: 98.1 °F (36.7 °C) (17 0800)  Pulse: 71 (17 0800)  Resp: 18 (17 0800)  BP: 129/79 (17 0800)  SpO2: 97 % (17 1805) Vital Signs (24h Range):  Temp:  [97.7 °F (36.5 °C)-98.4 °F (36.9 °C)] 98.1 °F (36.7 °C)  Pulse:  [] 71  Resp:  [18] 18  SpO2:  [97 %-100 %] 97 %  BP: ()/(34-82) 129/79     Weight: 86.2 kg (190 lb)  Body mass index is 37.11 kg/m².      Intake/Output Summary (Last 24 hours) at 17 1007  Last data filed at 17 2230   Gross per 24 hour   Intake          2362.83 ml   Output             1050 ml   Net          1312.83 ml       Significant Labs:  Lab Results   Component Value Date    GROUPTRH O POS 2017    HEPBSAG Negative 2017    STREPBCULT No Group B Streptococcus isolated 2017       Recent Labs  Lab 17  0630   HGB 9.5*   HCT 31.3*       I have personallly reviewed all pertinent lab results from the last 24 hours.    Physical Exam:   Constitutional: She is oriented to person, place, and time. She appears well-developed and well-nourished.             Abdominal: Soft.   Fundus firm below umbilicus     Genitourinary:   Genitourinary Comments: Small lochia           Musculoskeletal: Normal range of motion and moves all extremeties.       Neurological: She is alert and oriented to person, place, and time.    Skin: Skin is warm and dry.    Psychiatric: She has a  normal mood and affect. Her behavior is normal.

## 2017-12-13 NOTE — PLAN OF CARE
Problem: Patient Care Overview  Goal: Individualization & Mutuality  Outcome: Ongoing (interventions implemented as appropriate)  Pt stable following  delivery.  Moderate bleeding, oxytocin in use postpartally.  VS and pain WNL, ibuprofen in use for uterine cramping.  Infant breast feeding well and bonding well with family at BS.

## 2017-12-13 NOTE — ANESTHESIA RELEASE NOTE
Anesthesia Release from PACU Note    Patient: Lizbeth Barbosa    Procedure(s) Performed: * No procedures listed *    Anesthesia type: epidural    Post pain: Adequate analgesia    Post assessment: no apparent anesthetic complications and tolerated procedure well    Last Vitals:   Visit Vitals  /60   Pulse 65   Temp 36.9 °C (98.4 °F) (Oral)   Resp 20   Ht 5' (1.524 m)   Wt 86.2 kg (190 lb)   LMP 03/07/2017   SpO2 98%   Breastfeeding? No   BMI 37.11 kg/m²       Post vital signs: stable    Level of consciousness: awake, alert  and oriented    Nausea/Vomiting: no nausea/no vomiting    Complications: none    Airway Patency: patent    Respiratory: unassisted, spontaneous ventilation, room air    Cardiovascular: stable and blood pressure at baseline    Hydration: euvolemic

## 2017-12-13 NOTE — ANESTHESIA POSTPROCEDURE EVALUATION
Anesthesia Post Evaluation    Patient: Lizbeth Barbosa    Procedure(s) Performed: * No procedures listed *    Final Anesthesia Type: epidural  Patient location during evaluation: labor & delivery  Patient participation: Yes- Able to Participate  Level of consciousness: awake and alert  Post-procedure vital signs: reviewed and stable  Pain management: adequate  Airway patency: patent  PONV status at discharge: No PONV  Anesthetic complications: no      Cardiovascular status: blood pressure returned to baseline  Respiratory status: unassisted, spontaneous ventilation and room air  Hydration status: euvolemic  Follow-up not needed.        Visit Vitals  /60   Pulse 65   Temp 36.9 °C (98.4 °F) (Oral)   Resp 20   Ht 5' (1.524 m)   Wt 86.2 kg (190 lb)   LMP 03/07/2017   SpO2 98%   Breastfeeding? No   BMI 37.11 kg/m²       Pain/Solo Score: Pain Rating Prior to Med Admin: 7 (12/12/2017  9:04 AM)

## 2017-12-13 NOTE — PLAN OF CARE
Problem: Patient Care Overview  Goal: Plan of Care Review  Outcome: Ongoing (interventions implemented as appropriate)  Patient progressing well.  breast feeding without difficulty.  Bonding well with infant.  Free from falls.  No S&S of infection.  Pain well controlled with oral medications.  light bleeding, no clots expelled.  VSS.  NAD.

## 2017-12-13 NOTE — LACTATION NOTE
"Lactation Rounds:    Patient called for latch assistance. Mother  first and second baby for 2 and 3 years. Baby is showing feeding cues. Helped mother to settle in a football hold position on the leftbreast. Reviewed deep asymmetric latch and proper positioning. Mother is able to demonstrate back and deep latch easily obtained. Audible swallows noted, and mother reports initial pain and discomfort, once baby gets into rythmic sucking pattern mother reports pain 1/10. Baby feeding in progress and nipple shape and color is WDL upon unlatching. Reviewed hand expression and nipple care; mother able to return back demonstration. Right nipple appears to have 2 small blisters and a bruise at top of the nipple. Mother instructed on shallow vs deep latch and when to call lactation. Mother verbalized understanding.         12/13/17 1145   Maternal Infant Assessment   Breast Shape Bilateral:;round   Breast Density Bilateral:;soft   Areola Bilateral:;elastic   Nipple(s) Bilateral:;graspable   Nipple Symptoms redness;bruised;blisters;right:   Infant Assessment   Sucking Reflex present   Rooting Reflex present   Swallow Reflex present   Skin Color pink   Number of Stools (24 hours) 1   Number of Voids (24 hours) 1   LATCH Score   Latch 2-->grasps breast, tongue down, lips flanged, rhythmic sucking   Audible Swallowing 2-->spontaneous and intermittent (24 hrs old)   Type Of Nipple 2-->everted (after stimulation)   Comfort (Breast/Nipple) 1-->filling, red/small blisters/bruises, mild/mod discomfort   Hold (Positioning) 1-->minimal assist, teach one side: mother does other, staff holds   Score (less than 7 for 2/more consecutive times, consult Lactation Consultant) 8   Maternal Infant Feeding   Maternal Emotional State assist needed   Infant Positioning clutch/"football"   Signs of Milk Transfer audible swallow;infant jaw motion present;suck/swallow ratio   Presence of Pain yes   Pain Location nipple, left   Pain " Description soreness   Nipple Shape After Feeding, Left wdl   Latch Assistance yes   Breastfeeding Education adequate infant intake;adequate milk volume;importance of skin-to-skin contact;milk expression, hand   Feeding Infant   Feeding Tolerance/Success coordinated suck;coordinated swallow   Effective Latch During Feeding yes   Audible Swallow yes   Suck/Swallow Coordination present   Lactation Referrals   Lactation Consult Breastfeeding assessment;Follow up   Lactation Interventions   Attachment Promotion skin-to-skin contact encouraged;role responsibility promoted;infant-mother separation minimized;family involvement promoted;face-to-face positioning promoted;counseling provided;environment adjusted;breastfeeding assistance provided;privacy provided;rooming-in promoted   Breastfeeding Assistance feeding cue recognition promoted;feeding on demand promoted;feeding session observed;infant latch-on verified;infant stimulated to wakeful state;infant suck/swallow verified;assisted with positioning   Maternal Breastfeeding Support maternal rest encouraged;maternal nutrition promoted;maternal hydration promoted;lactation counseling provided;infant-mother separation minimized;encouragement offered;diary/feeding log utilized   Latch Promotion suck stimulated with colostrum drop;infant moved to breast;positioning assisted

## 2017-12-13 NOTE — TRANSFER OF CARE
Anesthesia Transfer of Care Note    Patient: Lizbeth Barbosa    Procedure(s) Performed: * No procedures listed *    Patient location: Labor and Delivery    Anesthesia Type: epidural    Post pain: adequate analgesia    Post assessment: no apparent anesthetic complications    Post vital signs: stable    Level of consciousness: awake, alert and oriented    Nausea/Vomiting: no nausea/vomiting    Complications: none    Transfer of care protocol was followed      Last vitals:   Visit Vitals  /60   Pulse 65   Temp 36.9 °C (98.4 °F) (Oral)   Resp 20   Ht 5' (1.524 m)   Wt 86.2 kg (190 lb)   LMP 03/07/2017   SpO2 98%   Breastfeeding? No   BMI 37.11 kg/m²

## 2017-12-13 NOTE — LACTATION NOTE
Lactation Rounds:    Lactation packet given and admit information reviewed. Mother verbalizes understanding of expected  behaviors and output for the first 48 hours of life.  Discussed the importance of cue based feedings on demand, unrestricted access to the breast, and frequent uninterrupted skin to skin contact.  Risk and implications of artificial nipples and supplementation discussed. Mother reports right nipple is sore. Deep vs shallow latch discussed. Infant not making feeding cues at the time, mother states infant recently fed.  Encouraged mother to call for assistance when desired or when infant is showing signs of hunger, contact number provided, mother verbalizes understanding.

## 2017-12-13 NOTE — PLAN OF CARE
Problem: Patient Care Overview  Goal: Plan of Care Review  Outcome: Ongoing (interventions implemented as appropriate)  Pt progressing well. Up ad fidel. Voids spontaneously without difficulty. Pain controlled with oral pain medications. Breastfeeding. Bonding well with baby. 20 gauge IV to Right hand removed without complication, catheter intact. Will continue to monitor. VSS.

## 2017-12-14 VITALS
HEART RATE: 65 BPM | BODY MASS INDEX: 37.3 KG/M2 | SYSTOLIC BLOOD PRESSURE: 124 MMHG | HEIGHT: 60 IN | TEMPERATURE: 98 F | RESPIRATION RATE: 18 BRPM | DIASTOLIC BLOOD PRESSURE: 81 MMHG | WEIGHT: 190 LBS | OXYGEN SATURATION: 97 %

## 2017-12-14 PROCEDURE — 99024 POSTOP FOLLOW-UP VISIT: CPT | Mod: ,,, | Performed by: ADVANCED PRACTICE MIDWIFE

## 2017-12-14 PROCEDURE — 25000003 PHARM REV CODE 250: Performed by: MIDWIFE

## 2017-12-14 RX ADMIN — IBUPROFEN 600 MG: 600 TABLET, FILM COATED ORAL at 08:12

## 2017-12-14 RX ADMIN — HYDROCODONE BITARTRATE AND ACETAMINOPHEN 1 TABLET: 5; 325 TABLET ORAL at 08:12

## 2017-12-14 RX ADMIN — DOCUSATE SODIUM 100 MG: 100 CAPSULE, LIQUID FILLED ORAL at 08:12

## 2017-12-14 NOTE — DISCHARGE INSTRUCTIONS
"Mother Self Care:    Activity: Avoid strenuous exercise and get adequate rest.  No driving until the physician consent given.  Emotional Changes: Most women find birth to be a time of great emotional upheaval.  Sense of loss, mood swings, fatigue, anxiety, and feeling "let down" are common.  If feelings worsen or last more than a week, call your physician.  Breast Care/Breastfeeding: Wear a bra for comfort.  Keep nipples dry and apply your own breast milk or lanolin cream as needed for soreness.  Engorgement can be relieved with warm, moist heat before feedings.  You may also take Ibuprofen.  Sam-Care/Vaginal Bleeding: Remember to use your sam-bottle after urinating.  Your flow will change from red, to pink, to yellow/white color over a period of 2 weeks.  Menstruation will return in 3-8 weeks, or longer if breastfeeding.  Episiotomy Vaginal Delivery: Stitches will dissolve within 10 days to 3 weeks.  Warm baths, tucks, and dermoplast will promote healing.  Avoid bubble baths or strong soaps.  Sexual Activity/Pelvic Rest: No sexual activity, tampons, or douching until your physician gives you consent.  Diet: Continue to eat from the five basic food groups, including plenty of protein, fruits, vegetables, and whole grains.  Limit empty calories and high fat foods.  Drink enough fluids to satisfy thirst and add an extra 500 calories for breastfeeding.  Constipation/Hemorrhoids: Drink plenty of water.  You may take a stool softener or natural laxative (Metamucil). You may use tucks or hemorrhoid ointment and soak in a warm tub.    CALL YOUR OB DOCTOR IF ANY OF THE FOLLOWING OCCURS:  *Heavy bleeding - saturating a pad an hour or passing any large (2-3 inches in size) blood clots.  *Any pain, redness, or tenderness in lower leg.  *You cannot care for yourself or your baby.  *Any signs of infection-      - Temperature greater than 100.5 degrees F      - Foul smelling vaginal discharge and/or incisional drainage      - " Increased episiotomy or incisional pain      - Hot, hard, red or sore area on breast      - Flu-like symptoms      - Any urgency, frequency or burning with urination

## 2017-12-14 NOTE — PROGRESS NOTES
[facility] -   Obstetrics  Postpartum Progress Note    Patient Name: Lizbeth Barbosa  MRN: [id number]  Admission Date: 2017  Hospital Length of Stay: 2 days  Attending Physician: Jodee Velasquez MD  Primary Care Provider: Susie Kim MD    Subjective:     Principal Problem:, delivered, current hospitalization    Hospital course: 1217 at 0545hrs Outpatient, NST, Re-evaluate 1-2 hrs, progressed to   17 routine PP care  17 discharge home today    Interval History:     She is doing well this morning. She is tolerating a regular diet without nausea or vomiting. She is voiding spontaneously. She is ambulating. She has passed flatus, and has a BM. Vaginal bleeding is mild. She denies fever or chills. Abdominal pain is mild and controlled with oral medications. She is breastfeeding. She desires circumcision for her male baby: not applicable.    Objective:     Vital Signs (Most Recent):  Temp: 98 °F (36.7 °C) (17 0000)  Pulse: 63 (17 0000)  Resp: 18 (17 0000)  BP: 100/70 (17 0000)  SpO2: 97 % (17 1805) Vital Signs (24h Range):  Temp:  [97.9 °F (36.6 °C)-98.1 °F (36.7 °C)] 98 °F (36.7 °C)  Pulse:  [61-71] 63  Resp:  [18] 18  BP: (100-129)/(60-79) 100/70     Weight: 86.2 kg (190 lb)  Body mass index is 37.11 kg/m².    No intake or output data in the 24 hours ending 17 0612    Significant Labs:  Lab Results   Component Value Date    GROUPTRH O POS 2017    HEPBSAG Negative 2017    STREPBCULT No Group B Streptococcus isolated 2017       Recent Labs  Lab 17  0630   HGB 9.5*   HCT 31.3*       I have personallly reviewed all pertinent lab results from the last 24 hours.    Physical Exam    Assessment/Plan:     22 y.o. female  for:    Previous  section    History successful ,  17  Routine PP care  17 routine pp care  Breastfeeding well            Disposition: As patient meets milestones,  will plan to discharge today    Maira Toure CNM  Obstetrics  [facility]

## 2017-12-14 NOTE — PLAN OF CARE
Problem: Patient Care Overview  Goal: Plan of Care Review  Outcome: Ongoing (interventions implemented as appropriate)  Pt progressing, bonding well with baby girl. VSS. Pain controlled with oral medications. Breast feeding without difficulty. NAD. Will continue to monitor progress.

## 2017-12-14 NOTE — DISCHARGE SUMMARY
[facility] -   Obstetrics  Discharge Summary      Patient Name: Lizbeth Barbosa  MRN: [id number]  Admission Date: 2017  Hospital Length of Stay: 2 days  Discharge Date and Time:  2017 6:18 AM  Attending Physician: Jodee Velasquez MD   Discharging Provider: Maira Toure CNM  Primary Care Provider: Susie Kim MD    HPI: 17 at 0545hrs 22yr old  with IUP 38w3d v's 40w0d- late PNC- C/O ctx's and desiring    17 routine pp care ready for discharge    * No surgery found *     Hospital Course:   1217 at 0545hrs Outpatient, NST, Re-evaluate 1-2 hrs, progressed to   17 routine PP care  17 discharge home today        Final Active Diagnoses:    Diagnosis Date Noted POA    PRINCIPAL PROBLEM:  , delivered, current hospitalization [O34.219] 2017 No    Obstetric labial laceration, delivered, current hospitalization [O70.0] 2017 No    Previous  section [Z98.891] 2014 Not Applicable      Problems Resolved During this Admission:    Diagnosis Date Noted Date Resolved POA    Irregular uterine contractions [O62.2] 2017 Yes    Late prenatal care [O09.30] 2017 Not Applicable    Normal labor [O80, Z37.9] 2017 Not Applicable        Labs:   CBC   Recent Labs  Lab 17  0630   WBC 12.65   HGB 9.5*   HCT 31.3*          Feeding Method: breast    Immunizations     Date Immunization Status Dose Route/Site Given by    17 1800 MMR Incomplete 0.5 mL Subcutaneous/Left deltoid     17 1800 Tdap Incomplete 0.5 mL Intramuscular/Left deltoid           Delivery:    Episiotomy: None   Lacerations: None   Repair suture:     Repair # of packets:     Blood loss (ml): 150     Birth information:  YOB: 2018   Time of birth: 4:42 PM   Sex: female   Delivery type: , Spontaneous   Gestational Age: 40w0d    Delivery Clinician:      Other providers:        Additional  information:  Forceps:    Vacuum:    Breech:    Observed anomalies      Living?:           APGARS  One minute Five minutes Ten minutes   Skin color:         Heart rate:         Grimace:         Muscle tone:         Breathing:         Totals: 8  9        Placenta: Delivered:       appearance    Pending Diagnostic Studies:     None          Discharged Condition: good    Disposition: Home or Self Care    Follow Up:  Follow-up Information     Follow up In 4 weeks.               Patient Instructions:     Diet general     Activity as tolerated       Medications:  Current Discharge Medication List      CONTINUE these medications which have NOT CHANGED    Details   butalbital-acetaminophen-caffeine -40 mg (FIORICET, ESGIC) -40 mg per tablet Take 1 tablet by mouth every 4 (four) hours as needed for Pain.  Qty: 30 tablet, Refills: 1             Maira Toure CNM  Obstetrics  [facility]

## 2017-12-14 NOTE — SUBJECTIVE & OBJECTIVE
Hospital course: 1217 at 0545hrs Outpatient, NST, Re-evaluate 1-2 hrs, progressed to   17 routine PP care  17 discharge home today    Interval History:     She is doing well this morning. She is tolerating a regular diet without nausea or vomiting. She is voiding spontaneously. She is ambulating. She has passed flatus, and has a BM. Vaginal bleeding is mild. She denies fever or chills. Abdominal pain is mild and controlled with oral medications. She is breastfeeding. She desires circumcision for her male baby: not applicable.    Objective:     Vital Signs (Most Recent):  Temp: 98 °F (36.7 °C) (17 0000)  Pulse: 63 (17 0000)  Resp: 18 (17 0000)  BP: 100/70 (17 0000)  SpO2: 97 % (17 1805) Vital Signs (24h Range):  Temp:  [97.9 °F (36.6 °C)-98.1 °F (36.7 °C)] 98 °F (36.7 °C)  Pulse:  [61-71] 63  Resp:  [18] 18  BP: (100-129)/(60-79) 100/70     Weight: 86.2 kg (190 lb)  Body mass index is 37.11 kg/m².    No intake or output data in the 24 hours ending 17 0612    Significant Labs:  Lab Results   Component Value Date    GROUPTRH O POS 2017    HEPBSAG Negative 2017    STREPBCULT No Group B Streptococcus isolated 2017       Recent Labs  Lab 17  0630   HGB 9.5*   HCT 31.3*       I have personallly reviewed all pertinent lab results from the last 24 hours.    Physical Exam

## 2017-12-14 NOTE — PROGRESS NOTES
Pt taken to car in wheelchair by staff with infant in car seat held by FOB. No distress noted at this time. Vss.

## 2017-12-15 NOTE — L&D DELIVERY NOTE
of viable female infant   Infant placed on maternal abdomen, vigorous and crying  Cord allowed to stop pulsating, ~ 2 minutes  Cord double clamped and cut, 3 VC noted  Placenta delivered spont and intact via felix method  Upon inspection of perineum and vagina, left labial laceration noted, repaired w/ 3-0 chromic, good hemostasis noted  Mom and infant stable in LDR, Skin to skin    Delivery Information for  Russ Barbosa    Birth information:  YOB: 2018   Time of birth: 4:42 PM   Sex: female   Head Delivery Date/Time: 2017  4:52 PM   Delivery type: , Spontaneous   Gestational Age: 40w0d    Delivery Providers    Delivering clinician:  Eric Miranda CNM   Other personnel:   Provider Role   Felicia Rivas RN Registered Nurse   Lilliana Wood RN Registered Nurse                Measurements    Weight:  3430 g Length:  49.5 cm   Head circum.:  34 cm Chest circum.:  33.5 cm   Abdominal girth:  34 cm         Stanton Assessment    Living status:  Living  Apgars:     1 Minute:   5 Minute:   10 Minute:   15 Minute:   20 Minute:     Skin Color:   0  1       Heart Rate:   2  2       Reflex Irritability:   2  2       Muscle Tone:   2  2       Respiratory Effort:   2  2       Total:   8  9               Apgars Assigned By:  REY RIVAS RN         Assisted Delivery Details:    Forceps attempted?:  No  Vacuum extractor attempted?:  No         Shoulder Dystocia    Shoulder dystocia present?:  No           Presentation and Position    Presentation:   Vertex   Position:   Left    Occiput    Anterior            Interventions/Resuscitation    Method:  Bulb Suctioning, Tactile Stimulation       Cord    Vessels:  3 vessels  Complications:  None  Delayed Cord Clamping?:  Yes  Cord Clamped Date/Time:  2017  4:44 PM  Cord Blood Disposition:  Lab  Gases Sent?:  No  Stem Cell Collection (by MD):  No       Placenta    Date and time:  2017  4:52 PM  Removal:  Spontaneous  Appearance:   Intact  Placenta disposition:  discarded           Labor Events:       labor: No     Labor Onset Date/Time: 2017 03:00     Dilation Complete Date/Time: 2017 16:26     Start Pushing Date/Time: 2017 16:34     Rupture Date/Time:              Rupture type:           Fluid Amount:        Fluid Color:        Fluid Odor:        Membrane Status (PeriCalm): ARM (Artificial Rupture)      Rupture Date/Time (PeriCalm): 2017 15:07:00      Fluid Amount (PeriCalm): Small      Fluid Color (PeriCalm): Clear       steroids:       Antibiotics given for GBS:       Induction:       Indications for induction:        Augmentation:       Indications for augmentation:       Labor complications: None     Additional complications:          Cervical ripening:                     Delivery:      Episiotomy: None     Indication for Episiotomy:       Perineal Lacerations: None Repaired:      Periurethral Laceration: none Repaired:     Labial Laceration: left Repaired: Yes   Sulcus Laceration: none Repaired:     Vaginal Laceration: No Repaired:     Cervical Laceration: No Repaired:     Repair suture:       Repair # of packets:       Vaginal delivery QBL (mL): 150      QBL (mL): 0     Combined Blood Loss (mL): 150     Vaginal Sweep Performed: No     Surgicount Correct: Yes       Other providers:       Anesthesia    Method:  Epidural          Details (if applicable):  Trial of Labor      Categorization:      Priority:     Indications for :     Incision Type:       Additional  information:  Forceps:    Vacuum:    Breech:    Observed anomalies    Other (Comments):

## 2017-12-17 ENCOUNTER — NURSE TRIAGE (OUTPATIENT)
Dept: ADMINISTRATIVE | Facility: CLINIC | Age: 22
End: 2017-12-17

## 2017-12-17 NOTE — TELEPHONE ENCOUNTER
Reason for Disposition   [1] SEVERE headache AND [2] after spinal (epidural) anesthesia   Difficult to awaken or acting confused  (e.g., disoriented, slurred speech)    Protocols used: ST POST-OP SYMPTOMS AND [id number]-H-PV, ST HEADACHE-A-AH    Pt states she began today with HA. Pt states she had epidural Tuesday for vaginal delivery. Pt took tylenol 3 times today for pain. Pt advised per protocol and verbalizes understanding.

## 2018-01-04 ENCOUNTER — HOSPITAL ENCOUNTER (EMERGENCY)
Facility: HOSPITAL | Age: 23
Discharge: OTHER INSTITUTION | End: 2018-01-04
Attending: EMERGENCY MEDICINE
Payer: COMMERCIAL

## 2018-01-04 ENCOUNTER — POSTPARTUM VISIT (OUTPATIENT)
Dept: OBSTETRICS AND GYNECOLOGY | Facility: CLINIC | Age: 23
End: 2018-01-04
Payer: COMMERCIAL

## 2018-01-04 ENCOUNTER — OFFICE VISIT (OUTPATIENT)
Dept: PSYCHIATRY | Facility: CLINIC | Age: 23
End: 2018-01-04
Payer: COMMERCIAL

## 2018-01-04 VITALS
WEIGHT: 167.56 LBS | BODY MASS INDEX: 32.89 KG/M2 | HEIGHT: 60 IN | DIASTOLIC BLOOD PRESSURE: 70 MMHG | SYSTOLIC BLOOD PRESSURE: 112 MMHG

## 2018-01-04 VITALS
OXYGEN SATURATION: 98 % | HEART RATE: 87 BPM | RESPIRATION RATE: 20 BRPM | SYSTOLIC BLOOD PRESSURE: 131 MMHG | TEMPERATURE: 98 F | DIASTOLIC BLOOD PRESSURE: 87 MMHG

## 2018-01-04 DIAGNOSIS — R45.851 SUICIDAL IDEATION: ICD-10-CM

## 2018-01-04 DIAGNOSIS — F32.2 SEVERE SINGLE CURRENT EPISODE OF MAJOR DEPRESSIVE DISORDER, WITHOUT PSYCHOTIC FEATURES: Primary | ICD-10-CM

## 2018-01-04 LAB
ALBUMIN SERPL BCP-MCNC: 4 G/DL
ALP SERPL-CCNC: 169 U/L
ALT SERPL W/O P-5'-P-CCNC: 17 U/L
AMPHET+METHAMPHET UR QL: NEGATIVE
ANION GAP SERPL CALC-SCNC: 11 MMOL/L
APAP SERPL-MCNC: <3 UG/ML
AST SERPL-CCNC: 15 U/L
BACTERIA #/AREA URNS HPF: ABNORMAL /HPF
BARBITURATES UR QL SCN>200 NG/ML: NEGATIVE
BASOPHILS # BLD AUTO: 0.03 K/UL
BASOPHILS NFR BLD: 0.3 %
BENZODIAZ UR QL SCN>200 NG/ML: NEGATIVE
BILIRUB SERPL-MCNC: 0.9 MG/DL
BILIRUB UR QL STRIP: NEGATIVE
BUN SERPL-MCNC: 11 MG/DL
BZE UR QL SCN: NEGATIVE
CALCIUM SERPL-MCNC: 9.5 MG/DL
CANNABINOIDS UR QL SCN: NEGATIVE
CHLORIDE SERPL-SCNC: 109 MMOL/L
CLARITY UR: CLEAR
CO2 SERPL-SCNC: 21 MMOL/L
COLOR UR: YELLOW
CREAT SERPL-MCNC: 0.6 MG/DL
CREAT UR-MCNC: 130.7 MG/DL
DIFFERENTIAL METHOD: ABNORMAL
EOSINOPHIL # BLD AUTO: 0.2 K/UL
EOSINOPHIL NFR BLD: 1.7 %
ERYTHROCYTE [DISTWIDTH] IN BLOOD BY AUTOMATED COUNT: 16.7 %
EST. GFR  (AFRICAN AMERICAN): >60 ML/MIN/1.73 M^2
EST. GFR  (NON AFRICAN AMERICAN): >60 ML/MIN/1.73 M^2
ETHANOL SERPL-MCNC: <10 MG/DL
GLUCOSE SERPL-MCNC: 84 MG/DL
GLUCOSE UR QL STRIP: NEGATIVE
HCT VFR BLD AUTO: 38.8 %
HGB BLD-MCNC: 12 G/DL
HGB UR QL STRIP: ABNORMAL
KETONES UR QL STRIP: NEGATIVE
LEUKOCYTE ESTERASE UR QL STRIP: ABNORMAL
LYMPHOCYTES # BLD AUTO: 3.3 K/UL
LYMPHOCYTES NFR BLD: 38 %
MCH RBC QN AUTO: 25.2 PG
MCHC RBC AUTO-ENTMCNC: 30.9 G/DL
MCV RBC AUTO: 82 FL
METHADONE UR QL SCN>300 NG/ML: NEGATIVE
MICROSCOPIC COMMENT: ABNORMAL
MONOCYTES # BLD AUTO: 0.4 K/UL
MONOCYTES NFR BLD: 4.9 %
NEUTROPHILS # BLD AUTO: 4.9 K/UL
NEUTROPHILS NFR BLD: 55.1 %
NITRITE UR QL STRIP: NEGATIVE
OPIATES UR QL SCN: NEGATIVE
PCP UR QL SCN>25 NG/ML: NEGATIVE
PH UR STRIP: 6 [PH] (ref 5–8)
PLATELET # BLD AUTO: 264 K/UL
PMV BLD AUTO: 11.4 FL
POTASSIUM SERPL-SCNC: 3.7 MMOL/L
PROT SERPL-MCNC: 7.7 G/DL
PROT UR QL STRIP: NEGATIVE
RBC # BLD AUTO: 4.76 M/UL
RBC #/AREA URNS HPF: 10 /HPF (ref 0–4)
SALICYLATES SERPL-MCNC: <5 MG/DL
SODIUM SERPL-SCNC: 141 MMOL/L
SP GR UR STRIP: 1.02 (ref 1–1.03)
T4 FREE SERPL-MCNC: 1.03 NG/DL
TOXICOLOGY INFORMATION: NORMAL
TSH SERPL DL<=0.005 MIU/L-ACNC: 0.27 UIU/ML
URN SPEC COLLECT METH UR: ABNORMAL
UROBILINOGEN UR STRIP-ACNC: NEGATIVE EU/DL
WBC # BLD AUTO: 8.8 K/UL
WBC #/AREA URNS HPF: 10 /HPF (ref 0–5)

## 2018-01-04 PROCEDURE — 90791 PSYCH DIAGNOSTIC EVALUATION: CPT | Mod: S$GLB,,, | Performed by: SOCIAL WORKER

## 2018-01-04 PROCEDURE — 80053 COMPREHEN METABOLIC PANEL: CPT

## 2018-01-04 PROCEDURE — 84443 ASSAY THYROID STIM HORMONE: CPT

## 2018-01-04 PROCEDURE — 85025 COMPLETE CBC W/AUTO DIFF WBC: CPT

## 2018-01-04 PROCEDURE — 80307 DRUG TEST PRSMV CHEM ANLYZR: CPT

## 2018-01-04 PROCEDURE — 80320 DRUG SCREEN QUANTALCOHOLS: CPT

## 2018-01-04 PROCEDURE — 99285 EMERGENCY DEPT VISIT HI MDM: CPT

## 2018-01-04 PROCEDURE — 80329 ANALGESICS NON-OPIOID 1 OR 2: CPT

## 2018-01-04 PROCEDURE — 81000 URINALYSIS NONAUTO W/SCOPE: CPT

## 2018-01-04 PROCEDURE — 84439 ASSAY OF FREE THYROXINE: CPT

## 2018-01-04 PROCEDURE — 0503F POSTPARTUM CARE VISIT: CPT | Mod: S$GLB,,, | Performed by: ADVANCED PRACTICE MIDWIFE

## 2018-01-04 PROCEDURE — 99999 PR PBB SHADOW E&M-EST. PATIENT-LVL III: CPT | Mod: PBBFAC,,, | Performed by: ADVANCED PRACTICE MIDWIFE

## 2018-01-04 RX ORDER — ACETAMINOPHEN 325 MG/1
650 TABLET ORAL EVERY 6 HOURS PRN
Status: DISCONTINUED | OUTPATIENT
Start: 2018-01-04 | End: 2018-01-05 | Stop reason: HOSPADM

## 2018-01-04 RX ORDER — DIPHENHYDRAMINE HCL 50 MG
50 CAPSULE ORAL EVERY 4 HOURS PRN
Status: DISCONTINUED | OUTPATIENT
Start: 2018-01-04 | End: 2018-01-05 | Stop reason: HOSPADM
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
## 2018-01-04 NOTE — ED PROVIDER NOTES
"SCRIBE #1 NOTE: I, Prakash Archie, am scribing for, and in the presence of, Michael Fox MD. I have scribed the entire note.      History      Chief Complaint   Patient presents with    Psychiatric Evaluation     pt has been having dark thoughts thoughts of suicide; pt is 3.5 weeks postpartum vaginal delivery , pt is breastfeeding, recent split with her  and doesnt have a good support system at home, was seen by [facility] midwife and JANINE Mendez at [facility] today who both suggest P[facility]       Review of patient's allergies indicates:   Allergen Reactions    Latex, natural rubber Rash        HPI   HPI    2018, 5:09 PM   History obtained from the patient      History of Present Illness: Lizbeth Barbosa is a 22 y.o. female patient who is 3.5 weeks post partum vaginal delivery presents to the Emergency Department for SI which onset gradually 1-2 weeks ago. Pt states that she has been having "dark, strange thoughts since giving birth. " Pt states that she "feels that someone is going to take my baby, and when I am driving, I want to drive away w/ my baby or drive off a amada." Pt also states that "things will trigger me into a violent rage, which is not normal for me." Pt denies any homicidal ideations. Symptoms are constant and moderate in severity.  No mitigating or exacerbating factors reported. Associated sxs include anxiety. Pt reports a Hx of depression, stating she was dx "while in highschool." Pt is not currently on any mental disorder treatment medications. Patient denies any Hi, visual/ auditory hallucinations, alcohol use, IV drug use, self injury, sleep disturbances, confusion, and all other sxs at this time. No further complaints or concerns at this time.         Arrival mode: Personal vehicle    PCP: Susie Kim MD       Past Medical History:  Past Medical History:   Diagnosis Date    ADHD (attention deficit hyperactivity disorder)     Anemia 10/17/2014       Past Surgical History:  Past " Surgical History:   Procedure Laterality Date    adnoids       SECTION      x 1    TONSILLECTOMY      tonsils           Family History:  Family History   Problem Relation Age of Onset    No Known Problems Mother     No Known Problems Father     ADD / ADHD Sister     No Known Problems Daughter     ADD / ADHD Sister     No Known Problems Sister     No Known Problems Daughter     Breast cancer Neg Hx     Cancer Neg Hx     Colon cancer Neg Hx     Diabetes Neg Hx     Eclampsia Neg Hx     Hypertension Neg Hx     Miscarriages / Stillbirths Neg Hx      labor Neg Hx     Ovarian cancer Neg Hx     Stroke Neg Hx        Social History:  Social History     Social History Main Topics    Smoking status: Current Every Day Smoker     Packs/day: 1.00     Years: 2.00     Types: Cigarettes    Smokeless tobacco: Never Used    Alcohol use Yes      Comment: occ    Drug use: No    Sexual activity: Yes     Partners: Male     Birth control/ protection: OCP       ROS   Review of Systems   Constitutional: Negative for chills, diaphoresis and fever.        - IV drug use  - Alcohol use   Respiratory: Negative for shortness of breath.    Cardiovascular: Negative for chest pain.   Gastrointestinal: Negative for abdominal pain, diarrhea, nausea and vomiting.   Genitourinary: Negative for difficulty urinating and urgency.   Neurological: Negative for dizziness, syncope, weakness, light-headedness, numbness and headaches.   Psychiatric/Behavioral: Positive for suicidal ideas. Negative for agitation, confusion, hallucinations, self-injury and sleep disturbance. The patient is nervous/anxious.         - HI   All other systems reviewed and are negative.      Physical Exam      Initial Vitals [18]   BP Pulse Resp Temp SpO2   (!) 142/94 80 20 98.2 °F (36.8 °C) 99 %      MAP       110           Physical Exam  Nursing Notes and Vital Signs Reviewed.  Constitutional: Patient is in no apparent distress.  Well-developed and well-nourished.  Head: Atraumatic. Normocephalic.  Eyes: PERRL. EOM intact. Conjunctivae are not pale. No scleral icterus.  ENT: Mucous membranes are moist. Oropharynx is clear and symmetric.    Neck: Supple. Full ROM. No lymphadenopathy.  Cardiovascular: Regular rate. Regular rhythm. No murmurs, rubs, or gallops. Distal pulses are 2+ and symmetric.  Pulmonary/Chest: No respiratory distress. Clear to auscultation bilaterally. No wheezing or rales.  Abdominal: Soft and non-distended.  There is no tenderness.  No rebound, guarding, or rigidity. Good bowel sounds.  Musculoskeletal: Moves all extremities. No obvious deformities. No edema. No calf tenderness.  Skin: Warm and dry.  Neurological:  Alert, awake, and appropriate.  Normal speech.  No acute focal neurological deficits are appreciated.  Psychiatric:               Behavior: normal, cooperative              Mood and Affect: depressed & flat affect              Thought Process: within normal limits              Suicidal Ideations: Yes              Suicidal Plan: No specific plan to harm self              Homicidal Ideations: No              Hallucinations: none  .    ED Course    Procedures  ED Vital Signs:  Vitals:    01/04/18 1953 01/04/18 2113 01/04/18 2356   BP: (!) 142/94 125/84 131/87   Pulse: 80 88 87   Resp: 20 16 20   Temp: 98.2 °F (36.8 °C)     TempSrc: Oral     SpO2: 99%  98%       Abnormal Lab Results:  Labs Reviewed   CBC W/ AUTO DIFFERENTIAL - Abnormal; Notable for the following:        Result Value    MCH 25.2 (*)     MCHC 30.9 (*)     RDW 16.7 (*)     All other components within normal limits   COMPREHENSIVE METABOLIC PANEL - Abnormal; Notable for the following:     CO2 21 (*)     Alkaline Phosphatase 169 (*)     All other components within normal limits   TSH - Abnormal; Notable for the following:     TSH 0.270 (*)     All other components within normal limits   ACETAMINOPHEN LEVEL - Abnormal; Notable for the following:      Acetaminophen (Tylenol), Serum <3.0 (*)     All other components within normal limits   SALICYLATE LEVEL - Abnormal; Notable for the following:     Salicylate Lvl <5.0 (*)     All other components within normal limits   URINALYSIS - Abnormal; Notable for the following:     Occult Blood UA 3+ (*)     Leukocytes, UA Trace (*)     All other components within normal limits   URINALYSIS MICROSCOPIC - Abnormal; Notable for the following:     RBC, UA 10 (*)     WBC, UA 10 (*)     Bacteria, UA Few (*)     All other components within normal limits   DRUG SCREEN PANEL, URINE EMERGENCY   ALCOHOL,MEDICAL (ETHANOL)   T4, FREE        All Lab Results:  Results for orders placed or performed during the hospital encounter of 01/04/18   CBC auto differential   Result Value Ref Range    WBC 8.80 3.90 - 12.70 K/uL    RBC 4.76 4.00 - 5.40 M/uL    Hemoglobin 12.0 12.0 - 16.0 g/dL    Hematocrit 38.8 37.0 - 48.5 %    MCV 82 82 - 98 fL    MCH 25.2 (L) 27.0 - 31.0 pg    MCHC 30.9 (L) 32.0 - 36.0 g/dL    RDW 16.7 (H) 11.5 - 14.5 %    Platelets 264 150 - 350 K/uL    MPV 11.4 9.2 - 12.9 fL    Gran # 4.9 1.8 - 7.7 K/uL    Lymph # 3.3 1.0 - 4.8 K/uL    Mono # 0.4 0.3 - 1.0 K/uL    Eos # 0.2 0.0 - 0.5 K/uL    Baso # 0.03 0.00 - 0.20 K/uL    Gran% 55.1 38.0 - 73.0 %    Lymph% 38.0 18.0 - 48.0 %    Mono% 4.9 4.0 - 15.0 %    Eosinophil% 1.7 0.0 - 8.0 %    Basophil% 0.3 0.0 - 1.9 %    Differential Method Automated    Comprehensive metabolic panel   Result Value Ref Range    Sodium 141 136 - 145 mmol/L    Potassium 3.7 3.5 - 5.1 mmol/L    Chloride 109 95 - 110 mmol/L    CO2 21 (L) 23 - 29 mmol/L    Glucose 84 70 - 110 mg/dL    BUN, Bld 11 6 - 20 mg/dL    Creatinine 0.6 0.5 - 1.4 mg/dL    Calcium 9.5 8.7 - 10.5 mg/dL    Total Protein 7.7 6.0 - 8.4 g/dL    Albumin 4.0 3.5 - 5.2 g/dL    Total Bilirubin 0.9 0.1 - 1.0 mg/dL    Alkaline Phosphatase 169 (H) 55 - 135 U/L    AST 15 10 - 40 U/L    ALT 17 10 - 44 U/L    Anion Gap 11 8 - 16 mmol/L    eGFR if  African American >60 >60 mL/min/1.73 m^2    eGFR if non African American >60 >60 mL/min/1.73 m^2   TSH   Result Value Ref Range    TSH 0.270 (L) 0.400 - 4.000 uIU/mL   Drug screen panel, emergency   Result Value Ref Range    Benzodiazepines Negative     Methadone metabolites Negative     Cocaine (Metab.) Negative     Opiate Scrn, Ur Negative     Barbiturate Screen, Ur Negative     Amphetamine Screen, Ur Negative     THC Negative     Phencyclidine Negative     Creatinine, Random Ur 130.7 15.0 - 325.0 mg/dL    Toxicology Information SEE COMMENT    Ethanol   Result Value Ref Range    Alcohol, Medical, Serum <10 <10 mg/dL   Acetaminophen level   Result Value Ref Range    Acetaminophen (Tylenol), Serum <3.0 (L) 10.0 - 20.0 ug/mL   Salicylate level   Result Value Ref Range    Salicylate Lvl <5.0 (L) 15.0 - 30.0 mg/dL   Urinalysis   Result Value Ref Range    Specimen UA Urine, Clean Catch     Color, UA Yellow Yellow, Straw, Mago    Appearance, UA Clear Clear    pH, UA 6.0 5.0 - 8.0    Specific Gravity, UA 1.025 1.005 - 1.030    Protein, UA Negative Negative    Glucose, UA Negative Negative    Ketones, UA Negative Negative    Bilirubin (UA) Negative Negative    Occult Blood UA 3+ (A) Negative    Nitrite, UA Negative Negative    Urobilinogen, UA Negative <2.0 EU/dL    Leukocytes, UA Trace (A) Negative   Urinalysis Microscopic   Result Value Ref Range    RBC, UA 10 (H) 0 - 4 /hpf    WBC, UA 10 (H) 0 - 5 /hpf    Bacteria, UA Few (A) None-Occ /hpf    Microscopic Comment SEE COMMENT    T4, free   Result Value Ref Range    Free T4 1.03 0.71 - 1.51 ng/dL                The Emergency Provider reviewed the vital signs and test results, which are outlined above.    ED Discussion     6:03 PM: The PEC hold has been issued by Dr. Fox at this time for SI.    8:13 PM: Pt has been medically cleared by Dr. Fox at this time. Reassessed pt at this time. Pt is resting comfortably and appears in no acute distress. There are no psychiatric  services offered at this facility. D/w pt all pertinent ED information and plan to transfer to psychiatric facility for psychiatric treatment. Pt verbalizes understanding. Patient being transferred by SPD for ongoing personal protection en route. Pt will be transported by personnel trained in CPR and CPI. All questions and complaints have been addressed at this time. Pt condition is stable at this time and is clear to transfer to psychiatric facility at this time.       ED Medication(s):  Medications - No data to display    There are no discharge medications for this patient.            Medical Decision Making    Medical Decision Making:   Clinical Tests:   Lab Tests: Ordered and Reviewed           Scribe Attestation:   Scribe #1: I performed the above scribed service and the documentation accurately describes the services I performed. I attest to the accuracy of the note.    Attending:   Physician Attestation Statement for Scribe #1: I, Michael Fox MD, personally performed the services described in this documentation, as scribed by Prakash Almanza, in my presence, and it is both accurate and complete.          Clinical Impression       ICD-10-CM ICD-9-CM   1. Severe single current episode of major depressive disorder, without psychotic features F32.2 296.23   2. Suicidal ideation R45.851 V62.84       Disposition:   Disposition: Transferred  Condition: Stable         Michael Fox MD  01/05/18 0208

## 2018-01-04 NOTE — PROGRESS NOTES
"Lizbeth Barbosa is a 22 y.o. female  presents for a postpartum visit with c/o severe depression . States has been depressed since birth but getting worse She is status post  3 weeks ago.  Her hospitalization was not complicated.  She is breastfeeding.    Presents today with baby,  in waiting room with other two daughters.  Tearfully and articulately, she expressed increasing depression, frustration and anger and has had some "terrible thoughts" recently. She is struggling to be the mother she "usually is" and this is causing her stress.   Family-  from  during this pregnancy, he has a new girlfriend and works 10-7pm. The two girls are week on, week off between parents. Baby stays with mom- breastfeeding.  She plans to return to work but there is no childcare plan at this time  She does not know where her father is and her mother is doing 'her own thing". She has 3 sisters, one is 38 weeks pregnant with the  husbands brother and whilst she advised Lizbeth to be seen she is not offering any help. Two younger sisters live with mom. Denies having close friends so support structure is practically non-existent. Does not attend [employer]. Works at [employer]  Scheduled appt with psychotherapy and psychiatry   Following a visit with Andrea Simeon LCSW, we spoke together and it appears with worsening depression,ideas of suicide at times that she should attend ED She is in agreement.  is present and will bring her to [facility]          Past Medical History:   Diagnosis Date    ADHD (attention deficit hyperactivity disorder)     Anemia 10/17/2014     Past Surgical History:   Procedure Laterality Date    adnoids       SECTION      x 1    TONSILLECTOMY      tonsils       Review of patient's allergies indicates:   Allergen Reactions    Latex, natural rubber Rash     No current outpatient prescriptions on file.      Vitals:    18 1335   BP: 112/70           Assessment:  "       22 yr old  with severe depression 4 weeks S/P - please refer to notes by me and Andrea Simeon  Seen by LCSW  Refer to ED for immediate evaluation        Plan:  Routine follow up.

## 2018-01-04 NOTE — ED NOTES
Erika L&D nurse here in ER with breast pump to assist patient. Pt will have to pump milk for her baby while she is here.

## 2018-01-04 NOTE — ED NOTES
[employer] pump at bedside. Instructed on proper usage and to adjust suction according to comfort level. Verified appropriate flange fit- 27mm. Educated mother on frequency and duration of pumping in order to promote and maintain full milk supply. Hands on pumping technique reviewed. Encouraged hand expression after. Instructed mother on cleaning of breast pump parts. Reviewed proper milk handling, collection, storage, and transportation. Voices understanding. Bins for cleaning along with soap, towels, and extra bottles left with sitter and nurse due to pt being PEC. Pt pumped while I was at bs, tolerated well, and gave pumped breast milk to infant's father. Instructed her to have breast milk stored in a refrigerator between someone being able to  the milk for infant.

## 2018-01-05 NOTE — ED NOTES
Pt's family is not sure if they will be able to come to [place] daily to pickup expressed milk. However, breast milk can be frozen until they can pick it up. Pt will take [facility] breast pump for use while at facility.

## 2018-01-05 NOTE — ED NOTES
Pt belonging - 1 each - tshirt, jacket, leggings, pair of sandals, purple wash towel, earrings stud, belly button and post for vaginal, phone without screen.

## 2018-01-05 NOTE — ED NOTES
Pt's , Osman Barbosa's phone #: [phone]  Pt's mom, Marie's phone #: [phone]    Pt requests that her  is called and notified when transferred to another facility.

## 2018-01-05 NOTE — ED NOTES
Pt signed paper becoming responsible for return breast pump to [facility]. Pt departed with belongings, breast pump equipment and 2 bottles of expressed milk.

## 2018-01-05 NOTE — ED NOTES
Cat - [facility] [place] - placement. - another set of vitals needed and pt fmly needs to agree to  milk from facility.

## 2018-01-05 NOTE — ED NOTES
Admission packet faxed to [[facility]] [facility], [facility], [facility], [facility], [facility] [[facility]], [facility], [[place], [facility], [facility], [facility], [facility], [[place]][facility], [facility], [facility], [facility], [facility], [[facility], [facility], [facility], [facility], [facility], [facility], [[facility], [facility], [facility], [[facility] [place] [facility] [place] [facility], [[facility] [place], [facility], [facility], [facility]. Awaiting responses.

## 2018-01-05 NOTE — ED NOTES
[facility] to [facility]. Accepting Dr. Shonda MD Call report within 20-30 minutes @[phone] per Nurse Kesha. Nurse Garcia was informed.

## 2018-01-22 ENCOUNTER — POSTPARTUM VISIT (OUTPATIENT)
Dept: OBSTETRICS AND GYNECOLOGY | Facility: CLINIC | Age: 23
End: 2018-01-22
Payer: COMMERCIAL

## 2018-01-22 VITALS
SYSTOLIC BLOOD PRESSURE: 128 MMHG | WEIGHT: 168.88 LBS | HEIGHT: 60 IN | DIASTOLIC BLOOD PRESSURE: 64 MMHG | BODY MASS INDEX: 33.15 KG/M2

## 2018-01-22 PROCEDURE — 88175 CYTOPATH C/V AUTO FLUID REDO: CPT

## 2018-01-22 PROCEDURE — 99999 PR PBB SHADOW E&M-EST. PATIENT-LVL III: CPT | Mod: PBBFAC,,, | Performed by: ADVANCED PRACTICE MIDWIFE

## 2018-01-22 RX ORDER — QUETIAPINE FUMARATE 50 MG/1
50 TABLET, FILM COATED ORAL NIGHTLY
Status: ON HOLD | COMMUNITY
End: 2018-02-02 | Stop reason: HOSPADM

## 2018-01-22 RX ORDER — ALPRAZOLAM 0.25 MG/1
0.25 TABLET ORAL 3 TIMES DAILY PRN
Qty: 30 TABLET | Refills: 0 | Status: ON HOLD | OUTPATIENT
Start: 2018-01-22 | End: 2018-02-02 | Stop reason: HOSPADM

## 2018-01-22 RX ORDER — SERTRALINE HYDROCHLORIDE 50 MG/1
100 TABLET, FILM COATED ORAL DAILY
Qty: 30 TABLET | Refills: 3 | Status: ON HOLD | OUTPATIENT
Start: 2018-01-22 | End: 2018-02-02 | Stop reason: HOSPADM

## 2018-01-22 RX ORDER — ACETAMINOPHEN AND CODEINE PHOSPHATE 120; 12 MG/5ML; MG/5ML
1 SOLUTION ORAL DAILY
Qty: 30 TABLET | Refills: 12 | Status: SHIPPED | OUTPATIENT
Start: 2018-01-22 | End: 2019-07-16

## 2018-01-22 RX ORDER — SERTRALINE HYDROCHLORIDE 50 MG/1
100 TABLET, FILM COATED ORAL DAILY
COMMUNITY
End: 2018-01-22 | Stop reason: SDUPTHER

## 2018-01-22 NOTE — PROGRESS NOTES
Subjective:       Lizbeth Barbosa is a 22 y.o. female who presents for a postpartum visit. She is 4 weeks postpartum following a spontaneous vaginal delivery. I have fully reviewed the prenatal and intrapartum course. The delivery was at 40 gestational weeks. Outcome: spontaneous vaginal delivery. Anesthesia:epidural. Postpartum course has been complicated by PP depression. Baby's course has been normal. Baby is feeding by breast. Bleeding scant bleeding. Bowel function is normal. Bladder function is normal. Patient is not sexually active. Contraception method is oral progesterone-only contraceptive. Postpartum depression screening: positive Patient was inpatient for6 days Feels meds were helping but now declining. Cannot take vistaril because it decreased milk supply.Had psychiatrist appointment in next 2 weeks Will call tomorrow for an earlier appointment. Will increase zoloft to 100mg and RX xanax for short term use. Made patient appt with [facility] for Feb5, Denies and desire to hurt self or chi;dren    The following portions of the patient's history were reviewed and updated as appropriate: allergies, current medications, past family history, past medical history, past social history, past surgical history and problem list.    Review of Systems  A comprehensive review of systems was negative.     Objective:      /64 (BP Location: Left arm, Patient Position: Sitting, BP Method: Medium (Automatic))   Ht 5' (1.524 m)   Wt 76.6 kg (168 lb 14 oz)   Breastfeeding? Yes   BMI 32.98 kg/m²    General:  alert, appears stated age and cooperative    Breasts:  inspection negative, no nipple discharge or bleeding, no masses or nodularity palpable   Lungs: clear to auscultation bilaterally   Heart:  regular rate and rhythm, S1, S2 normal, no murmur, click, rub or gallop   Abdomen: soft, non-tender; bowel sounds normal; no masses,  no organomegaly    Vulva:  normal   Vagina: normal vagina   Cervix:   anteverted, no cervical motion tenderness and no lesions   Corpus: normal size, contour, position, consistency, mobility, non-tender   Adnexa:  normal adnexa   Rectal Exam: Not performed.          Assessment:       normal  postpartum exam. Pap smear done at today's visit.     Plan:      1. Contraception: oral progesterone-only contraceptive  2. Psych appointment made   3. Follow up in: 1 week or as needed.

## 2018-01-30 PROBLEM — F31.81: Chronic | Status: ACTIVE | Noted: 2018-01-26

## 2018-02-02 PROBLEM — Z51.5 COMFORT MEASURES ONLY STATUS: Status: ACTIVE | Noted: 2018-02-02

## 2018-02-03 ENCOUNTER — NURSE TRIAGE (OUTPATIENT)
Dept: ADMINISTRATIVE | Facility: CLINIC | Age: 23
End: 2018-02-03

## 2018-02-24 NOTE — PROGRESS NOTES
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 01/04/2018   Component Date Value Ref Range Status    WBC 01/04/2018 8.80  3.90 - 12.70 K/uL Final    RBC 01/04/2018 4.76  4.00 - 5.40 M/uL Final    Hemoglobin 01/04/2018 12.0  12.0 - 16.0 g/dL Final    Hematocrit 01/04/2018 38.8  37.0 - 48.5 % Final    MCV 01/04/2018 82  82 - 98 fL Final    MCH 01/04/2018 25.2* 27.0 - 31.0 pg Final    MCHC 01/04/2018 30.9* 32.0 - 36.0 g/dL Final    RDW 01/04/2018 16.7* 11.5 - 14.5 % Final    Platelets 01/04/2018 264  150 - 350 K/uL Final    MPV 01/04/2018 11.4  9.2 - 12.9 fL Final    Gran # (ANC) 01/04/2018 4.9  1.8 - 7.7 K/uL Final    Lymph # 01/04/2018 3.3  1.0 - 4.8 K/uL Final    Mono # 01/04/2018 0.4  0.3 - 1.0 K/uL Final    Eos # 01/04/2018 0.2  0.0 - 0.5 K/uL Final    Baso # 01/04/2018 0.03  0.00 - 0.20 K/uL Final    Gran% 01/04/2018 55.1  38.0 - 73.0 % Final    Lymph% 01/04/2018 38.0  18.0 - 48.0 % Final    Mono% 01/04/2018 4.9  4.0 - 15.0 % Final    Eosinophil% 01/04/2018 1.7  0.0 - 8.0 % Final    Basophil% 01/04/2018 0.3  0.0 - 1.9 % Final    Differential Method 01/04/2018 Automated   Final    Sodium 01/04/2018 141  136 - 145 mmol/L Final    Potassium  01/04/2018 3.7  3.5 - 5.1 mmol/L Final    Chloride 01/04/2018 109  95 - 110 mmol/L Final    CO2 01/04/2018 21* 23 - 29 mmol/L Final    Glucose 01/04/2018 84  70 - 110 mg/dL Final    BUN, Bld 01/04/2018 11  6 - 20 mg/dL Final    Creatinine 01/04/2018 0.6  0.5 - 1.4 mg/dL Final    Calcium 01/04/2018 9.5  8.7 - 10.5 mg/dL Final    Total Protein 01/04/2018 7.7  6.0 - 8.4 g/dL Final    Albumin 01/04/2018 4.0  3.5 - 5.2 g/dL Final    Total Bilirubin 01/04/2018 0.9  0.1 - 1.0 mg/dL Final    Alkaline Phosphatase 01/04/2018 169* 55 - 135 U/L Final    AST 01/04/2018 15  10 - 40 U/L Final    ALT 01/04/2018 17  10 - 44 U/L Final    Anion Gap 01/04/2018 11  8 - 16 mmol/L Final    eGFR if African American 01/04/2018 >60  >60 mL/min/1.73 m^2 Final    eGFR if non African American 01/04/2018 >60  >60 mL/min/1.73 m^2 Final    TSH 01/04/2018 0.270* 0.400 - 4.000 uIU/mL Final    Benzodiazepines 01/04/2018 Negative   Final    Methadone metabolites 01/04/2018 Negative   Final    Cocaine (Metab.) 01/04/2018 Negative   Final    Opiate Scrn, Ur 01/04/2018 Negative   Final    Barbiturate Screen, Ur 01/04/2018 Negative   Final    Amphetamine Screen, Ur 01/04/2018 Negative   Final    THC 01/04/2018 Negative   Final    Phencyclidine 01/04/2018 Negative   Final    Creatinine, Random Ur 01/04/2018 130.7  15.0 - 325.0 mg/dL Final    Toxicology Information 01/04/2018 SEE COMMENT   Final    Alcohol, Medical, Serum 01/04/2018 <10  <10 mg/dL Final    Acetaminophen (Tylenol), Serum 01/04/2018 <3.0* 10.0 - 20.0 ug/mL Final    Salicylate Lvl 01/04/2018 <5.0* 15.0 - 30.0 mg/dL Final    Specimen UA 01/04/2018 Urine, Clean Catch   Final    Color, UA 01/04/2018 Yellow  Yellow, Straw, Mago Final    Appearance, UA 01/04/2018 Clear  Clear Final    pH, UA 01/04/2018 6.0  5.0 - 8.0 Final    Specific Gravity, UA 01/04/2018 1.025  1.005 - 1.030 Final    Protein, UA 01/04/2018 Negative  Negative Final    Glucose, UA 01/04/2018  Negative  Negative Final    Ketones, UA 01/04/2018 Negative  Negative Final    Bilirubin (UA) 01/04/2018 Negative  Negative Final    Occult Blood UA 01/04/2018 3+* Negative Final    Nitrite, UA 01/04/2018 Negative  Negative Final    Urobilinogen, UA 01/04/2018 Negative  <2.0 EU/dL Final    Leukocytes, UA 01/04/2018 Trace* Negative Final    RBC, UA 01/04/2018 10* 0 - 4 /hpf Final    WBC, UA 01/04/2018 10* 0 - 5 /hpf Final    Bacteria, UA 01/04/2018 Few* None-Occ /hpf Final    Microscopic Comment 01/04/2018 SEE COMMENT   Final    Free T4 01/04/2018 1.03  0.71 - 1.51 ng/dL Final         Medications  Outpatient Encounter Prescriptions as of 2/26/2018   Medication Sig Dispense Refill    metoprolol succinate (TOPROL-XL) 25 MG 24 hr tablet Take 0.5 tablets (12.5 mg total) by mouth once daily. 15 tablet 0    nicotine (NICODERM CQ) 14 mg/24 hr Place 1 patch onto the skin daily as needed (smoking). 30 patch 0    norethindrone (ORTHO MICRONOR) 0.35 mg tablet Take 1 tablet (0.35 mg total) by mouth once daily. 30 tablet 12    OXcarbazepine (TRILEPTAL) 300 MG Tab Take 1 tablet (300 mg total) by mouth 2 (two) times daily. 60 tablet 0    QUEtiapine (SEROQUEL) 300 MG Tab Take 1 tablet (300 mg total) by mouth every evening. 30 tablet 0     No facility-administered encounter medications on file as of 2/26/2018.      Assessment - Diagnosis - Goals:     Impression: 23 y/o F with bipolar disorder (1 vs. 2), with recent depressive episode in context of postpartum period. Moderately improved on follow-up.     Treatment Goals:  Specify outcomes written in observable, behavioral terms:   Prevent lokesh, reduce depressive symptoms    Treatment Plan/Recommendations:   · Quetiapine 300 mg qhs + oxcarbazepine 450 mg po bid.   · Discussed risks, benefits, and alternatives to treatment plan documented above with patient. I answered all patient questions related to this plan and patient expressed understanding and agreement.      Return to Clinic: 2 months    Counseling time: 10 minutes  Total time: 50 minutes    YINA Valencia MD  Psychiatry  [facility]  [address] , [place] [address]  [phone]

## 2018-02-26 ENCOUNTER — OFFICE VISIT (OUTPATIENT)
Dept: PSYCHIATRY | Facility: CLINIC | Age: 23
End: 2018-02-26
Payer: COMMERCIAL

## 2018-02-26 DIAGNOSIS — F31.81: Primary | Chronic | ICD-10-CM

## 2018-02-26 PROCEDURE — 90792 PSYCH DIAG EVAL W/MED SRVCS: CPT | Mod: S$GLB,,, | Performed by: PSYCHIATRY & NEUROLOGY

## 2018-02-26 RX ORDER — METOPROLOL SUCCINATE 25 MG/1
12.5 TABLET, EXTENDED RELEASE ORAL DAILY
Qty: 15 TABLET | Refills: 2 | Status: SHIPPED | OUTPATIENT
Start: 2018-02-26 | End: 2019-07-16

## 2018-02-26 RX ORDER — OXCARBAZEPINE 300 MG/1
TABLET, FILM COATED ORAL
Qty: 90 TABLET | Refills: 2 | Status: SHIPPED | OUTPATIENT
Start: 2018-02-26 | End: 2019-07-02 | Stop reason: ALTCHOICE

## 2018-02-26 RX ORDER — QUETIAPINE FUMARATE 300 MG/1
300 TABLET, FILM COATED ORAL NIGHTLY
Qty: 30 TABLET | Refills: 2 | Status: SHIPPED | OUTPATIENT
Start: 2018-02-26 | End: 2019-07-16

## 2019-06-03 ENCOUNTER — TELEPHONE (OUTPATIENT)
Dept: OBSTETRICS AND GYNECOLOGY | Facility: CLINIC | Age: 24
End: 2019-06-03

## 2019-06-03 NOTE — TELEPHONE ENCOUNTER
----- Message from Lucy Rodriguez MA sent at 6/3/2019  4:08 PM CDT -----  Contact: self      ----- Message -----  From: Ashlee Mg MA  Sent: 6/3/2019   3:54 PM  To: Kwab[facility] Ocampo Staff    Lizbeth Barbosa  MRN: [id number]  Home Phone      [phone]  Work Phone      Not on file.  Mobile          [phone]    Patient Care Team:  Susie Kim MD as PCP - General (Family Medicine)  OB? Yes, Unknown  What phone number can you be reached at?  [phone]  Message:  Needs to make an appt with Damaris Yuan for pregnancy.  Stated is currently 23 weeks pregnant and is currently not seeing anyone for her pregnancy at this time.  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

## 2019-07-02 ENCOUNTER — INITIAL PRENATAL (OUTPATIENT)
Dept: OBSTETRICS AND GYNECOLOGY | Facility: CLINIC | Age: 24
End: 2019-07-02
Payer: COMMERCIAL

## 2019-07-02 ENCOUNTER — LAB VISIT (OUTPATIENT)
Dept: LAB | Facility: OTHER | Age: 24
End: 2019-07-02
Payer: COMMERCIAL

## 2019-07-02 VITALS
WEIGHT: 195.56 LBS | DIASTOLIC BLOOD PRESSURE: 70 MMHG | BODY MASS INDEX: 38.19 KG/M2 | SYSTOLIC BLOOD PRESSURE: 116 MMHG

## 2019-07-02 DIAGNOSIS — Z34.92 PREGNANT AND NOT YET DELIVERED IN SECOND TRIMESTER: Primary | ICD-10-CM

## 2019-07-02 DIAGNOSIS — Z34.92 PREGNANT AND NOT YET DELIVERED IN SECOND TRIMESTER: ICD-10-CM

## 2019-07-02 LAB
ABO + RH BLD: NORMAL
BASOPHILS # BLD AUTO: 0.01 K/UL (ref 0–0.2)
BASOPHILS NFR BLD: 0.1 % (ref 0–1.9)
BLD GP AB SCN CELLS X3 SERPL QL: NORMAL
C TRACH DNA SPEC QL NAA+PROBE: NOT DETECTED
DIFFERENTIAL METHOD: ABNORMAL
EOSINOPHIL # BLD AUTO: 0.1 K/UL (ref 0–0.5)
EOSINOPHIL NFR BLD: 0.5 % (ref 0–8)
ERYTHROCYTE [DISTWIDTH] IN BLOOD BY AUTOMATED COUNT: 13.3 % (ref 11.5–14.5)
HCT VFR BLD AUTO: 31.9 % (ref 37–48.5)
HGB BLD-MCNC: 10.4 G/DL (ref 12–16)
LYMPHOCYTES # BLD AUTO: 2.9 K/UL (ref 1–4.8)
LYMPHOCYTES NFR BLD: 21 % (ref 18–48)
MCH RBC QN AUTO: 29 PG (ref 27–31)
MCHC RBC AUTO-ENTMCNC: 32.6 G/DL (ref 32–36)
MCV RBC AUTO: 89 FL (ref 82–98)
MONOCYTES # BLD AUTO: 0.6 K/UL (ref 0.3–1)
MONOCYTES NFR BLD: 4.3 % (ref 4–15)
N GONORRHOEA DNA SPEC QL NAA+PROBE: NOT DETECTED
NEUTROPHILS # BLD AUTO: 10 K/UL (ref 1.8–7.7)
NEUTROPHILS NFR BLD: 74.1 % (ref 38–73)
PLATELET # BLD AUTO: 275 K/UL (ref 150–350)
PMV BLD AUTO: 11.4 FL (ref 9.2–12.9)
RBC # BLD AUTO: 3.59 M/UL (ref 4–5.4)
WBC # BLD AUTO: 13.77 K/UL (ref 3.9–12.7)

## 2019-07-02 PROCEDURE — 86901 BLOOD TYPING SEROLOGIC RH(D): CPT

## 2019-07-02 PROCEDURE — 86592 SYPHILIS TEST NON-TREP QUAL: CPT

## 2019-07-02 PROCEDURE — 85025 COMPLETE CBC W/AUTO DIFF WBC: CPT

## 2019-07-02 PROCEDURE — 3008F BODY MASS INDEX DOCD: CPT | Mod: CPTII,S$GLB,, | Performed by: ADVANCED PRACTICE MIDWIFE

## 2019-07-02 PROCEDURE — 99999 PR PBB SHADOW E&M-EST. PATIENT-LVL II: CPT | Mod: PBBFAC,,, | Performed by: ADVANCED PRACTICE MIDWIFE

## 2019-07-02 PROCEDURE — 87340 HEPATITIS B SURFACE AG IA: CPT

## 2019-07-02 PROCEDURE — 87491 CHLMYD TRACH DNA AMP PROBE: CPT

## 2019-07-02 PROCEDURE — 86762 RUBELLA ANTIBODY: CPT

## 2019-07-02 PROCEDURE — 0500F PR INITIAL PRENATAL CARE VISIT: ICD-10-PCS | Mod: S$GLB,,, | Performed by: ADVANCED PRACTICE MIDWIFE

## 2019-07-02 PROCEDURE — 3008F PR BODY MASS INDEX (BMI) DOCUMENTED: ICD-10-PCS | Mod: CPTII,S$GLB,, | Performed by: ADVANCED PRACTICE MIDWIFE

## 2019-07-02 PROCEDURE — 36415 COLL VENOUS BLD VENIPUNCTURE: CPT

## 2019-07-02 PROCEDURE — 86703 HIV-1/HIV-2 1 RESULT ANTBDY: CPT

## 2019-07-02 PROCEDURE — 99999 PR PBB SHADOW E&M-EST. PATIENT-LVL II: ICD-10-PCS | Mod: PBBFAC,,, | Performed by: ADVANCED PRACTICE MIDWIFE

## 2019-07-02 PROCEDURE — 0500F INITIAL PRENATAL CARE VISIT: CPT | Mod: S$GLB,,, | Performed by: ADVANCED PRACTICE MIDWIFE

## 2019-07-02 NOTE — PROGRESS NOTES
Lizbeth Barbosa is a 23 y.o. , presents today for amenorrhea.      C/C: amenorrhea  She has not seen any other provider for this pregnancy    HPI: Reports amenorrhea since. LMP was 2018. Prior to LMP, menses were regular occuring every 29-30days prior to this.  She is not currently on any contraception. + UPT on 2019. Reports urinary frequency - occasional BH - + FM - heartburn - sciatic nerve pain.     SOCIAL HISTORY: Denies emotional/mental/physical/sexual violence or abuse. Feels safe at home. Accompanied today by Osman father of baby, fifth pregnancy and fourth baby for both.     PAP HISTORY: last pap 2017, result WNL - denies any history of abnormal pap smear or STDs.     Reports the following medical conditions:     Review of patient's allergies indicates:   Allergen Reactions    Latex, natural rubber Rash     Past Medical History:   Diagnosis Date    ADHD (attention deficit hyperactivity disorder)     Anemia 10/17/2014    Anxiety     Bipolar II disorder with postpartum onset     Headache     History of psychiatric hospitalization     Hx of psychiatric care     Postpartum depression     Psychiatric problem     Psychosis     Sleep difficulties     Therapy      Past Surgical History:   Procedure Laterality Date    adnoids       SECTION      x 1    TONSILLECTOMY      tonsils       Past Surgical History:   Procedure Laterality Date    adnoids       SECTION      x 1    TONSILLECTOMY      tonsils       OB History    Para Term  AB Living   5 3 3   1 3   SAB TAB Ectopic Multiple Live Births   1     0 3      # Outcome Date GA Lbr Mendoza/2nd Weight Sex Delivery Anes PTL Lv   5 Current            4 Term 17 40w0d 13:26 / 00:16 3.43 kg (7 lb 9 oz) F  EPI N SEBASTIAN   3 Term 14 40w5d  3.711 kg (8 lb 2.9 oz)   EPI N SEBASTIAN      Complications: , delivered   2 SAB 14 8w0d       DEC   1 Term 13 38w0d  2.863 kg (6 lb 5 oz) F  CS-Unspec   SEBASTIAN      Birth Comments: long labor     OB History        5    Para   3    Term   3            AB   1    Living   3       SAB   1    TAB        Ectopic        Multiple   0    Live Births   3               Social History     Socioeconomic History    Marital status:      Spouse name: Frederick Barbosa    Number of children: 3    Years of education: Not on file    Highest education level: Not on file   Occupational History    Not on file   Social Needs    Financial resource strain: Not on file    Food insecurity:     Worry: Not on file     Inability: Not on file    Transportation needs:     Medical: Not on file     Non-medical: Not on file   Tobacco Use    Smoking status: Current Every Day Smoker     Packs/day: 1.00     Years: 2.00     Pack years: 2.00     Types: Cigarettes    Smokeless tobacco: Never Used   Substance and Sexual Activity    Alcohol use: Yes     Alcohol/week: 5.4 oz     Types: 9 Glasses of wine per week     Comment: 3x a week    Drug use: No    Sexual activity: Yes     Partners: Male     Birth control/protection: None   Lifestyle    Physical activity:     Days per week: Not on file     Minutes per session: Not on file    Stress: Not on file   Relationships    Social connections:     Talks on phone: Not on file     Gets together: Not on file     Attends [employer] service: Not on file     Active member of club or organization: Not on file     Attends meetings of clubs or organizations: Not on file     Relationship status: Not on file   Other Topics Concern    Patient feels they ought to cut down on drinking/drug use Yes    Patient annoyed by others criticizing their drinking/drug use No    Patient has felt bad or guilty about drinking/drug use Yes    Patient has had a drink/used drugs as an eye opener in the AM Yes   Social History Narrative    Not on file     Family History   Problem Relation Age of Onset    No Known Problems Mother     Drug abuse  Father     ADD / ADHD Sister     Depression Sister     No Known Problems Daughter     ADD / ADHD Sister     No Known Problems Sister     No Known Problems Daughter     Breast cancer Neg Hx     Cancer Neg Hx     Colon cancer Neg Hx     Diabetes Neg Hx     Eclampsia Neg Hx     Hypertension Neg Hx     Miscarriages / Stillbirths Neg Hx      labor Neg Hx     Ovarian cancer Neg Hx     Stroke Neg Hx      Social History     Substance and Sexual Activity   Sexual Activity Yes    Partners: Male    Birth control/protection: None       GENETIC SCREENING   Patient's age 35 years or older as of estimated date of delivery? no  Neural tube defect (meningomyelocele, spina bifida, or anencephaly)? no  Down syndrome? no  Kalpesh-Sachs (Ashkenazi Church, Cajun, Turkish Israeli)? no  Canavan disease (Ashkenazi Church)? no  Familial dysautonomia (Ashkenazi Church)? no  Sickle cell disease or trait ()? no  Hemophilia or other blood disorders? no  Cystic fibrosis? no  Muscular dystrophy? no  Kailua's chorea? no  Thalassemia (Italian, Greek, Mediterranean, or  background) MCV less than 80? no  Congenital heart defect? no  Mental retardation/autism? no   If Yes, was person tested for Fragile X? -  Other inherited genetic or chromosomal disorder? no  Maternal metabolic disorder (e.g. type 1 diabetes, PKU)? no  Patient or baby's father had a child with birth defects not listed above? no  Recurrent pregnancy loss or a stillbirth: sister lost 4-5 babies - unknown cause   Medications (including supplements, vitamins, herbs or OTC drugs)/illicit/recreational drugs/alcohol since last menstrual period? Boric acid intravaginally - 6 to 8 times    If yes, agent(s) and strength/dose:   List any other genetic risks:   Comments/counseling: -    INFECTION HISTORY  Live with someone with TB or exposed to TB: no  Patient or partner has history of genital herpes: no  Rash or viral illness since last menstrual period:  no  Patient or partner has hepatitis B or C: no  History of STD, gonorrhea, chlamydia, HPV, HIV, syphilis (list all that apply): no  List other infections: -  Additional comments: -     Susie Kim MD     ROS:    Constitutional/Gen: Denies fevers, chills, malaise, or weight loss. Reports fatigue   Psych: Denies depression, anxiety  Eyes: Denies changes in vision or scotomata  Ears, nose, mouth, throat: Denies sinus tenderness, swelling, or dentition problems  CV/vasc: Denies heart palpitations or edema  Resp: Denies SOB or dyspnea  Breasts: Denies mass or trauma. - breast tenderness. Nursing youngest child.  GI: Denies constipation, diarrhea, or vomiting. no nausea.  : Denies vaginal discharge, dysuria or pelvic pain. reports urinary frequency  MS: Denies weakness, soreness, or changes in ROM    OBJECTIVE:  /70   Wt 88.7 kg (195 lb 8.8 oz)   BMI 38.19 kg/m²   Constitutional/Gen: NAD, appears stated age, well groomed  Neck: supple, no masses or enlargement  Head: normocephalic  Skin: warm and dry w/o rash  Lung: normal resp effort, CTAB  Heart: normal HR, RRR   Back: negative CVAT  Breasts: bilaterally--no masses, tenderness, skin changes, or nipple discharge noted  Abdomen: soft, nontender, no masses, and bowel sounds normal, no enlargement  External genitalia: no lesions or discharge, normal hair distribution  Urethral meatus: normal size and location, no lesions or prolapse  Vagina: normal appearance, no lesions, no discharge, no evidence cystocele or rectocele.  Cervix: normal appearance, no discharge, no lesions, negative CMT  Uterus: nontender, mobile, approx 29 week size, contour, and position. 140 FHT   Adnexa: no masses or tenderness  Anus/Perineum: normal appearance, with no lesions or discharge. Internal exam deferred.  Extremities: FROM, with no edema or tenderness.  Neurologic: A&O x 4, non-focal, cranial nerves 2-12 grossly intact  Psych: affect appropriate and without signs of mood,  thought or memory difficulty appreciated    UPT + in office    ASSESSMENT:  23 y.o. female  with amenorrhea  Likely at 26w6d via LMP; S>D - sono ordered   Body mass index is 38.19 kg/m².  Patient Active Problem List   Diagnosis    Previous  section    Tobacco use    , delivered, current hospitalization    Bipolar II disorder with postpartum onset    Comfort measures only status    Postpartum psychosis       PLAN:  1. Amenorrhea  -- + UPT in office, No LMP recorded. Patient is pregnant. --> Estimated Date of Delivery: None noted.  -- Anatomical US ordered   -- Routine serum and urine prenatal labs today.   -- Pt declines Glucose screen - reviewed risks of declining screen. She continues to decline.     2. Physical exam today.     3. BMI 34.2 (prepregnancy weight 175 per pt)   -- Discussed IOM recommended weight gain of:   Weight category Pre pre BMI  Recommended TWG   Underweight Less than 18.5 28-40    Normal Weight 18.5-24.9  25-35    Overweight 25-29.9  15-25    Obese   30 and greater  11-20   -- Discussed criteria for delivery at [facility] r/t excessive pre-preg weight or excessive weight gain:   Pre-pregnancy BMI over 40 or excess pregnancy weight gain defined as:   Pre-preg BMI < 18.5; Excess weight gain = > 60 pound   Pre-preg BMI 18.5-24.9;  Excess weight gain = > 53 pounds   Pre-preg BMI 25-29.9;  Excess weight gain = > 38 pounds   Pre-preg BMI > 30;  Excess weight gain = > 30 pounds    4. Discussed nausea and vomiting in pregnancy  -- Education regarding lifestyle and dietary modifications  -- Reviewed use of B6/Unisom prn. Pt will notify us if no relief/worsening symptoms, will consider alternative therapies prn    5. Pregnancy education and couseling; handouts and booklet provided  -- Oriented to practice and anticipated prenatal course of care and how to contact us  -- Reviewed ABC guidelines and admission, exclusion, and transfer criteria  -- Precautions/warning signs reviewed  --  Common complaints of pregnancy  -- Routine prenatal labs including HIV  -- Ultrasounds  -- Childbirth education/hospital/[facility] facilities  -- Nutrition, prepregnant BMI, and recommended weight gain  -- Toxoplasmosis precautions (Cats/Raw Meat)  -- Sexual activity and exercise  -- Environmental/Work hazards  -- Travel  -- Tobacco (Ask, Advise, Assess, Assist, and Arrange), as well as alcohol and drug use  -- Use of any medications (Including supplements, Vitamins, Herbs, or OTC Drugs)  -- Domestic violence screen neg  -- Pt considering transfer to [place] for care as she'd like to have a waterbirth.     6. Reviewed genetic testing options. Reviewed available first trimester and/or second  trimester screening options. Reviewed risk of false positive/negative results and recommendation of referral to [facility] in event of a positive result, for NIPT, US, and/or amniocentesis. Reviewed the possible estimated 1 in 300/500 risk of miscarriage with amniocentesis, even with a healthy fetus. Patient declines genetic screening.     7. Bipolar - not on meds and pt states she has been off treatment since LMP. She is tearful today and states it's been difficult to cope with swings in mood, but she doesn't want to be on any meds that pose risk to baby. She denies HI/SI and promises to call with any ideation. Called office of Dr. Helen Syed with pt consent to expedite psychiatry consult given GA and extensive history - pt to expect a call tomorrow to discuss appointment.     8. Reviewed warning signs, precautions, and how/when to contact us.     9. TOLAC - pt on list for review.     10. RTC 2 weeks.     Updated Medication List:  Current Outpatient Medications   Medication Sig Dispense Refill    metoprolol succinate (TOPROL-XL) 25 MG 24 hr tablet Take 0.5 tablets (12.5 mg total) by mouth once daily. 15 tablet 2    norethindrone (ORTHO MICRONOR) 0.35 mg tablet Take 1 tablet (0.35 mg total) by mouth once daily. 30 tablet 12     QUEtiapine (SEROQUEL) 300 MG Tab Take 1 tablet (300 mg total) by mouth every evening. 30 tablet 2     No current facility-administered medications for this visit.          Malinda Hannah CNM  7/2/2019 2:44 PM

## 2019-07-03 LAB
HBV SURFACE AG SERPL QL IA: NEGATIVE
HIV 1+2 AB+HIV1 P24 AG SERPL QL IA: NEGATIVE
RPR SER QL: NORMAL
RUBV IGG SER-ACNC: 10.6 IU/ML
RUBV IGG SER-IMP: REACTIVE

## 2019-07-10 ENCOUNTER — PATIENT MESSAGE (OUTPATIENT)
Dept: OBSTETRICS AND GYNECOLOGY | Facility: CLINIC | Age: 24
End: 2019-07-10

## 2019-07-15 ENCOUNTER — DOCUMENTATION ONLY (OUTPATIENT)
Dept: OBSTETRICS AND GYNECOLOGY | Facility: CLINIC | Age: 24
End: 2019-07-15

## 2019-07-15 NOTE — PROGRESS NOTES
Date: 07/15/19     Reviewed patient medical and obstetrical history with Dr. Zamora.     Patients History is significant for Bipolar (pp onset), postpartum psychosis, and previous C/S with  x 2.        Dr. Zamora recommendations for patient: pt is cleared for TOLAC (attempt to retrieve records)

## 2019-07-16 ENCOUNTER — PROCEDURE VISIT (OUTPATIENT)
Dept: MATERNAL FETAL MEDICINE | Facility: CLINIC | Age: 24
End: 2019-07-16
Payer: COMMERCIAL

## 2019-07-16 ENCOUNTER — ROUTINE PRENATAL (OUTPATIENT)
Dept: OBSTETRICS AND GYNECOLOGY | Facility: CLINIC | Age: 24
End: 2019-07-16
Payer: COMMERCIAL

## 2019-07-16 VITALS
SYSTOLIC BLOOD PRESSURE: 114 MMHG | DIASTOLIC BLOOD PRESSURE: 68 MMHG | BODY MASS INDEX: 37.61 KG/M2 | WEIGHT: 192.56 LBS

## 2019-07-16 DIAGNOSIS — Z34.92 PRENATAL CARE IN SECOND TRIMESTER: Primary | ICD-10-CM

## 2019-07-16 DIAGNOSIS — B37.9 YEAST INFECTION: ICD-10-CM

## 2019-07-16 DIAGNOSIS — Z36.89 ENCOUNTER FOR FETAL ANATOMIC SURVEY: ICD-10-CM

## 2019-07-16 DIAGNOSIS — Z34.92 PREGNANT AND NOT YET DELIVERED IN SECOND TRIMESTER: ICD-10-CM

## 2019-07-16 PROCEDURE — 87510 GARDNER VAG DNA DIR PROBE: CPT

## 2019-07-16 PROCEDURE — 76805 OB US >/= 14 WKS SNGL FETUS: CPT | Mod: S$GLB,,, | Performed by: OBSTETRICS & GYNECOLOGY

## 2019-07-16 PROCEDURE — 99999 PR PBB SHADOW E&M-EST. PATIENT-LVL II: CPT | Mod: PBBFAC,,, | Performed by: ADVANCED PRACTICE MIDWIFE

## 2019-07-16 PROCEDURE — 87480 CANDIDA DNA DIR PROBE: CPT

## 2019-07-16 PROCEDURE — 76805 PR US, OB 14+WKS, TRANSABD, SINGLE GESTATION: ICD-10-PCS | Mod: S$GLB,,, | Performed by: OBSTETRICS & GYNECOLOGY

## 2019-07-16 PROCEDURE — 0502F SUBSEQUENT PRENATAL CARE: CPT | Mod: CPTII,S$GLB,, | Performed by: ADVANCED PRACTICE MIDWIFE

## 2019-07-16 PROCEDURE — 99999 PR PBB SHADOW E&M-EST. PATIENT-LVL II: ICD-10-PCS | Mod: PBBFAC,,, | Performed by: ADVANCED PRACTICE MIDWIFE

## 2019-07-16 PROCEDURE — 0502F PR SUBSEQUENT PRENATAL CARE: ICD-10-PCS | Mod: CPTII,S$GLB,, | Performed by: ADVANCED PRACTICE MIDWIFE

## 2019-07-17 DIAGNOSIS — B37.9 YEAST INFECTION: Primary | ICD-10-CM

## 2019-07-17 LAB
BACTERIAL VAGINOSIS DNA: ABNORMAL
CANDIDA GLABRATA DNA: NEGATIVE
CANDIDA KRUSEI DNA: NEGATIVE
CANDIDA RRNA VAG QL PROBE: POSITIVE
T VAGINALIS RRNA GENITAL QL PROBE: NEGATIVE

## 2019-07-17 RX ORDER — TERCONAZOLE 4 MG/G
1 CREAM VAGINAL NIGHTLY
Qty: 45 G | Refills: 0 | Status: SHIPPED | OUTPATIENT
Start: 2019-07-17 | End: 2019-07-24

## 2019-07-19 ENCOUNTER — PATIENT MESSAGE (OUTPATIENT)
Dept: OBSTETRICS AND GYNECOLOGY | Facility: HOSPITAL | Age: 24
End: 2019-07-19

## 2019-08-07 ENCOUNTER — TELEPHONE (OUTPATIENT)
Dept: OBSTETRICS AND GYNECOLOGY | Facility: CLINIC | Age: 24
End: 2019-08-07

## 2019-08-07 NOTE — TELEPHONE ENCOUNTER
Returned call to pt.  Pt stated woke up this morning with a really bad HA and took bp and it was 99/52.  Pt has not taken anything for the HA and states that is persistent and so has her low BP.  Advised pt that she could take Tylenol for HA to see if it helps also advised pt that she could go to the [facility] for evaluation.  Pt states the she lives about an hour and 15 mins away asked pt ifg has a closed hospital with OB dept pt verbalized there was one approx 5 mins away advised pt okay to be evaluated at this hospital and for pt to call back to clinic to advise us of status.  Pt verbalized agreement with plan.            ----- Message from Ashlee Larkin sent at 8/7/2019 10:44 AM CDT -----  Contact: KENDALL GOMEZ [[id number]]  Name of Who is Calling: KENDALL GOMEZ [[id number]]       What is the request in detail: Patient is requesting a call from staff she states she's having headaches and blood pressure dropping and would like to know if she should be seen today .....Please contact to further discuss and advise.     Can the clinic reply by [facility]: yes     What Number to Call Back if not in [facility]: [phone]

## 2019-08-08 ENCOUNTER — DOCUMENTATION ONLY (OUTPATIENT)
Dept: OBSTETRICS AND GYNECOLOGY | Facility: CLINIC | Age: 24
End: 2019-08-08

## 2019-08-08 NOTE — PROGRESS NOTES
Called pt to f/u on complaints and recommended ED visit.  Pt stated that she did not go to the ED because she had to work.  Pt stated that she increased her fluids, took ibuprofen, and rested and felt better today.  BP was 111/60.  I advised pt that we do not recommend Ibuprofen in pregnancy and that she use Tylenol regular strength instead.  Pt verbalized understanding.  Pt instructed to return call to clinic with any additional complaints or concerns

## 2019-08-18 ENCOUNTER — PATIENT MESSAGE (OUTPATIENT)
Dept: OBSTETRICS AND GYNECOLOGY | Facility: OTHER | Age: 24
End: 2019-08-18

## 2019-08-18 DIAGNOSIS — Z34.93 PRENATAL CARE IN THIRD TRIMESTER: Primary | ICD-10-CM

## 2019-08-19 ENCOUNTER — PATIENT MESSAGE (OUTPATIENT)
Dept: MATERNAL FETAL MEDICINE | Facility: CLINIC | Age: 24
End: 2019-08-19

## 2019-08-27 ENCOUNTER — PROCEDURE VISIT (OUTPATIENT)
Dept: MATERNAL FETAL MEDICINE | Facility: CLINIC | Age: 24
End: 2019-08-27
Payer: COMMERCIAL

## 2019-08-27 DIAGNOSIS — Z34.93 PRENATAL CARE IN THIRD TRIMESTER: ICD-10-CM

## 2019-08-27 PROCEDURE — 76805 OB US >/= 14 WKS SNGL FETUS: CPT | Mod: S$GLB,,, | Performed by: PEDIATRICS

## 2019-08-27 PROCEDURE — 76805 PR US, OB 14+WKS, TRANSABD, SINGLE GESTATION: ICD-10-PCS | Mod: S$GLB,,, | Performed by: PEDIATRICS

## 2019-08-27 PROCEDURE — 99499 UNLISTED E&M SERVICE: CPT | Mod: S$GLB,,, | Performed by: PEDIATRICS

## 2019-08-27 PROCEDURE — 99499 NO LOS: ICD-10-PCS | Mod: S$GLB,,, | Performed by: PEDIATRICS

## 2019-08-29 PROBLEM — O99.210 OBESITY IN PREGNANCY: Status: ACTIVE | Noted: 2019-08-29

## 2019-09-03 ENCOUNTER — PATIENT MESSAGE (OUTPATIENT)
Dept: OBSTETRICS AND GYNECOLOGY | Facility: CLINIC | Age: 24
End: 2019-09-03

## 2019-09-03 ENCOUNTER — LAB VISIT (OUTPATIENT)
Dept: LAB | Facility: OTHER | Age: 24
End: 2019-09-03
Attending: ADVANCED PRACTICE MIDWIFE
Payer: COMMERCIAL

## 2019-09-03 ENCOUNTER — ROUTINE PRENATAL (OUTPATIENT)
Dept: OBSTETRICS AND GYNECOLOGY | Facility: CLINIC | Age: 24
End: 2019-09-03
Payer: COMMERCIAL

## 2019-09-03 VITALS
WEIGHT: 203.69 LBS | SYSTOLIC BLOOD PRESSURE: 118 MMHG | BODY MASS INDEX: 39.78 KG/M2 | DIASTOLIC BLOOD PRESSURE: 70 MMHG

## 2019-09-03 DIAGNOSIS — Z34.93 PRENATAL CARE IN THIRD TRIMESTER: ICD-10-CM

## 2019-09-03 DIAGNOSIS — Z34.93 PRENATAL CARE IN THIRD TRIMESTER: Primary | ICD-10-CM

## 2019-09-03 LAB — RPR SER QL: NORMAL

## 2019-09-03 PROCEDURE — 36415 COLL VENOUS BLD VENIPUNCTURE: CPT

## 2019-09-03 PROCEDURE — 86592 SYPHILIS TEST NON-TREP QUAL: CPT

## 2019-09-03 PROCEDURE — 0502F SUBSEQUENT PRENATAL CARE: CPT | Mod: CPTII,S$GLB,, | Performed by: ADVANCED PRACTICE MIDWIFE

## 2019-09-03 PROCEDURE — 0502F PR SUBSEQUENT PRENATAL CARE: ICD-10-PCS | Mod: CPTII,S$GLB,, | Performed by: ADVANCED PRACTICE MIDWIFE

## 2019-09-03 PROCEDURE — 87081 CULTURE SCREEN ONLY: CPT

## 2019-09-03 PROCEDURE — 99999 PR PBB SHADOW E&M-EST. PATIENT-LVL II: ICD-10-PCS | Mod: PBBFAC,,, | Performed by: ADVANCED PRACTICE MIDWIFE

## 2019-09-03 PROCEDURE — 99999 PR PBB SHADOW E&M-EST. PATIENT-LVL II: CPT | Mod: PBBFAC,,, | Performed by: ADVANCED PRACTICE MIDWIFE

## 2019-09-03 RX ORDER — FLUCONAZOLE 150 MG/1
150 TABLET ORAL DAILY
Qty: 2 TABLET | Refills: 0 | Status: SHIPPED | OUTPATIENT
Start: 2019-09-03 | End: 2019-09-05

## 2019-09-03 NOTE — PROGRESS NOTES
24 y.o. female  at 35w6d   Patient was planning to transfer to [facility] due to desire for waterbirth (she is TOLAC/  patient)--patient states today she changed her mind and admits she has not had many visits since opting to transfer care to .   She declines/ refuses GDM screening-understands risks of undiagnosed GDM including increased risk of fetal demise and wishes to proceed without screening.  Recent sonogram  normal/ 22% AGA fetus  Reports + FM, denies VB, LOF or regular CTX  Doing well without concerns c/o possible yeast infection  TW lbs   Delivery consents signed today  GBS collected today   Reviewed [place] department of [facility] recommendation for repeat HIV/RPR today; she will get blood drawn.   RTC x 1 wks, call or present sooner prn.   Birth Center Risk Assessment: 1  0- CNM management in ABC  1- CNM management on L&D  2- Consultation with OB to develop plan of care  3- Collaborative CNM/OB management with delivery on L&D  4- Referral or transfer of care to MD Yenny Bonilla CNM, MSN  2019  1:00 PM

## 2019-09-05 LAB — BACTERIA SPEC AEROBE CULT: NORMAL

## 2019-09-10 ENCOUNTER — ROUTINE PRENATAL (OUTPATIENT)
Dept: OBSTETRICS AND GYNECOLOGY | Facility: CLINIC | Age: 24
End: 2019-09-10
Payer: COMMERCIAL

## 2019-09-10 ENCOUNTER — APPOINTMENT (OUTPATIENT)
Dept: LAB | Facility: OTHER | Age: 24
End: 2019-09-10
Attending: ADVANCED PRACTICE MIDWIFE
Payer: COMMERCIAL

## 2019-09-10 VITALS
WEIGHT: 202.81 LBS | DIASTOLIC BLOOD PRESSURE: 62 MMHG | SYSTOLIC BLOOD PRESSURE: 106 MMHG | BODY MASS INDEX: 39.61 KG/M2

## 2019-09-10 DIAGNOSIS — R35.0 URINARY FREQUENCY: Primary | ICD-10-CM

## 2019-09-10 DIAGNOSIS — Z34.83 ENCOUNTER FOR SUPERVISION OF OTHER NORMAL PREGNANCY IN THIRD TRIMESTER: ICD-10-CM

## 2019-09-10 PROCEDURE — 0502F PR SUBSEQUENT PRENATAL CARE: ICD-10-PCS | Mod: CPTII,S$GLB,, | Performed by: ADVANCED PRACTICE MIDWIFE

## 2019-09-10 PROCEDURE — 99999 PR PBB SHADOW E&M-EST. PATIENT-LVL II: CPT | Mod: PBBFAC,,, | Performed by: ADVANCED PRACTICE MIDWIFE

## 2019-09-10 PROCEDURE — 99999 PR PBB SHADOW E&M-EST. PATIENT-LVL II: ICD-10-PCS | Mod: PBBFAC,,, | Performed by: ADVANCED PRACTICE MIDWIFE

## 2019-09-10 PROCEDURE — 0502F SUBSEQUENT PRENATAL CARE: CPT | Mod: CPTII,S$GLB,, | Performed by: ADVANCED PRACTICE MIDWIFE

## 2019-09-10 PROCEDURE — 87086 URINE CULTURE/COLONY COUNT: CPT

## 2019-09-10 NOTE — PROGRESS NOTES
In with c/o of urinary frequency x 1 week. Reports urine dip today with positive nitrites and pos leuks. Advised will send urine culture and increase po hydration and add cranberry juice. Reports good FM, reinforced BID.Having 3rd tri labs done today.

## 2019-09-12 DIAGNOSIS — D50.9 IRON DEFICIENCY ANEMIA DURING PREGNANCY: Primary | ICD-10-CM

## 2019-09-12 DIAGNOSIS — O99.019 IRON DEFICIENCY ANEMIA DURING PREGNANCY: Primary | ICD-10-CM

## 2019-09-12 LAB — BACTERIA UR CULT: NORMAL

## 2019-09-12 RX ORDER — IRON POLYSACCHARIDE COMPLEX 150 MG
150 CAPSULE ORAL 2 TIMES DAILY
Qty: 60 CAPSULE | Refills: 3 | Status: SHIPPED | OUTPATIENT
Start: 2019-09-12 | End: 2020-01-10

## 2019-09-17 ENCOUNTER — ROUTINE PRENATAL (OUTPATIENT)
Dept: OBSTETRICS AND GYNECOLOGY | Facility: CLINIC | Age: 24
End: 2019-09-17
Payer: COMMERCIAL

## 2019-09-17 VITALS
BODY MASS INDEX: 39.42 KG/M2 | WEIGHT: 201.81 LBS | DIASTOLIC BLOOD PRESSURE: 68 MMHG | SYSTOLIC BLOOD PRESSURE: 102 MMHG

## 2019-09-17 DIAGNOSIS — Z34.83 ENCOUNTER FOR SUPERVISION OF OTHER NORMAL PREGNANCY IN THIRD TRIMESTER: Primary | ICD-10-CM

## 2019-09-17 PROCEDURE — 99212 OFFICE O/P EST SF 10 MIN: CPT | Mod: PBBFAC | Performed by: ADVANCED PRACTICE MIDWIFE

## 2019-09-17 PROCEDURE — 0502F SUBSEQUENT PRENATAL CARE: CPT | Mod: S$GLB,,, | Performed by: ADVANCED PRACTICE MIDWIFE

## 2019-09-17 PROCEDURE — 0502F PR SUBSEQUENT PRENATAL CARE: ICD-10-PCS | Mod: S$GLB,,, | Performed by: ADVANCED PRACTICE MIDWIFE

## 2019-09-17 PROCEDURE — 99999 PR PBB SHADOW E&M-EST. PATIENT-LVL II: CPT | Mod: PBBFAC,,, | Performed by: ADVANCED PRACTICE MIDWIFE

## 2019-09-17 PROCEDURE — 99999 PR PBB SHADOW E&M-EST. PATIENT-LVL II: ICD-10-PCS | Mod: PBBFAC,,, | Performed by: ADVANCED PRACTICE MIDWIFE

## 2019-09-17 RX ORDER — CALCIUM CARBONATE 200(500)MG
1 TABLET,CHEWABLE ORAL DAILY
COMMUNITY
End: 2022-11-16

## 2019-09-21 NOTE — PROGRESS NOTES
24 y.o. female  at 38w3d   Reports + FM, denies VB, LOF or regular CTX  Doing well without concerns   Reviewed warning signs, normal FKCs, labor precautions and how/when to call.  RTC x 1 wks, call or present sooner prn.

## 2019-09-24 ENCOUNTER — ROUTINE PRENATAL (OUTPATIENT)
Dept: OBSTETRICS AND GYNECOLOGY | Facility: CLINIC | Age: 24
End: 2019-09-24
Payer: COMMERCIAL

## 2019-09-24 VITALS — WEIGHT: 208 LBS | DIASTOLIC BLOOD PRESSURE: 76 MMHG | SYSTOLIC BLOOD PRESSURE: 120 MMHG | BODY MASS INDEX: 40.62 KG/M2

## 2019-09-24 DIAGNOSIS — Z3A.38 38 WEEKS GESTATION OF PREGNANCY: Primary | ICD-10-CM

## 2019-09-24 DIAGNOSIS — Z34.93 PRENATAL CARE IN THIRD TRIMESTER: ICD-10-CM

## 2019-09-24 PROCEDURE — 0502F SUBSEQUENT PRENATAL CARE: CPT | Mod: CPTII,S$GLB,, | Performed by: NURSE PRACTITIONER

## 2019-09-24 PROCEDURE — 99999 PR PBB SHADOW E&M-EST. PATIENT-LVL III: ICD-10-PCS | Mod: PBBFAC,,, | Performed by: NURSE PRACTITIONER

## 2019-09-24 PROCEDURE — 0502F PR SUBSEQUENT PRENATAL CARE: ICD-10-PCS | Mod: CPTII,S$GLB,, | Performed by: NURSE PRACTITIONER

## 2019-09-24 PROCEDURE — 99999 PR PBB SHADOW E&M-EST. PATIENT-LVL III: CPT | Mod: PBBFAC,,, | Performed by: NURSE PRACTITIONER

## 2019-09-26 NOTE — PROGRESS NOTES
24 y.o. female  at 38w6d   Reports + FM, denies VB, LOF or regular CTX  Doing well without concerns.  Breast RX given  TW lbs   Delivery consents already signed  Reviewed GBS -  Reviewed repeat HIV (-); did not repeat RPR  Reviewed warning signs, normal FKCs, labor precautions and how/when to call.  RTC x 1 wks, call or present sooner prn.     Birth Center Risk Assessment: 1-management on labor and Delivery - TOLAC    0- CNM management in ABC  1- CNM management on L&D  2- Consultation with OB to develop  plan of care  3- Collaborative CNM/OB management with delivery on L&D  4- Permanent referral of care to MD

## 2019-10-01 ENCOUNTER — ROUTINE PRENATAL (OUTPATIENT)
Dept: OBSTETRICS AND GYNECOLOGY | Facility: CLINIC | Age: 24
End: 2019-10-01
Payer: COMMERCIAL

## 2019-10-01 VITALS
SYSTOLIC BLOOD PRESSURE: 122 MMHG | DIASTOLIC BLOOD PRESSURE: 76 MMHG | BODY MASS INDEX: 40.19 KG/M2 | WEIGHT: 205.81 LBS

## 2019-10-01 DIAGNOSIS — Z34.83 ENCOUNTER FOR SUPERVISION OF OTHER NORMAL PREGNANCY IN THIRD TRIMESTER: Primary | ICD-10-CM

## 2019-10-01 PROCEDURE — 0502F SUBSEQUENT PRENATAL CARE: CPT | Mod: CPTII,S$GLB,, | Performed by: ADVANCED PRACTICE MIDWIFE

## 2019-10-01 PROCEDURE — 0502F PR SUBSEQUENT PRENATAL CARE: ICD-10-PCS | Mod: CPTII,S$GLB,, | Performed by: ADVANCED PRACTICE MIDWIFE

## 2019-10-01 PROCEDURE — 99999 PR PBB SHADOW E&M-EST. PATIENT-LVL II: CPT | Mod: PBBFAC,,, | Performed by: ADVANCED PRACTICE MIDWIFE

## 2019-10-01 PROCEDURE — 99999 PR PBB SHADOW E&M-EST. PATIENT-LVL II: ICD-10-PCS | Mod: PBBFAC,,, | Performed by: ADVANCED PRACTICE MIDWIFE

## 2019-10-02 ENCOUNTER — ROUTINE PRENATAL (OUTPATIENT)
Dept: OBSTETRICS AND GYNECOLOGY | Facility: CLINIC | Age: 24
End: 2019-10-02
Payer: COMMERCIAL

## 2019-10-02 VITALS
WEIGHT: 205.81 LBS | SYSTOLIC BLOOD PRESSURE: 122 MMHG | BODY MASS INDEX: 40.19 KG/M2 | DIASTOLIC BLOOD PRESSURE: 72 MMHG

## 2019-10-02 DIAGNOSIS — O99.019 IRON DEFICIENCY ANEMIA DURING PREGNANCY: ICD-10-CM

## 2019-10-02 DIAGNOSIS — D50.9 IRON DEFICIENCY ANEMIA DURING PREGNANCY: ICD-10-CM

## 2019-10-02 DIAGNOSIS — Z34.93 PREGNANCY WITH ONE FETUS IN THIRD TRIMESTER: Primary | ICD-10-CM

## 2019-10-02 DIAGNOSIS — O48.0 POST-TERM PREGNANCY, 40-42 WEEKS OF GESTATION: ICD-10-CM

## 2019-10-02 PROCEDURE — 0502F SUBSEQUENT PRENATAL CARE: CPT | Mod: CPTII,S$GLB,, | Performed by: ADVANCED PRACTICE MIDWIFE

## 2019-10-02 PROCEDURE — 99999 PR PBB SHADOW E&M-EST. PATIENT-LVL II: CPT | Mod: PBBFAC,,, | Performed by: ADVANCED PRACTICE MIDWIFE

## 2019-10-02 PROCEDURE — 0502F PR SUBSEQUENT PRENATAL CARE: ICD-10-PCS | Mod: CPTII,S$GLB,, | Performed by: ADVANCED PRACTICE MIDWIFE

## 2019-10-02 PROCEDURE — 99999 PR PBB SHADOW E&M-EST. PATIENT-LVL II: ICD-10-PCS | Mod: PBBFAC,,, | Performed by: ADVANCED PRACTICE MIDWIFE

## 2019-10-02 NOTE — PROGRESS NOTES
24 y.o. female  at 40w0d   Reports + FM, denies VB, LOF or regular CTX  Doing well without concerns   TW lbs   Reviewed warning signs, normal FKCs, labor precautions and how/when to call.  RTC x 1 wks, call or present sooner prn.

## 2019-10-02 NOTE — PROGRESS NOTES
Chief Complaint   Patient presents with    Routine Prenatal Visit       24 y.o. female  at 40w0d, by Estimated Date of Delivery: 10/2/19    Complaints today: patient presents to clinic today requesting a vaginal exam - having occasional irregular UCs. Doing well today.  Reviewed TW lbs    ROS  OBSTETRICS:   Contractions Occasional   Bleeding None   Loss of fluid None   Fetal mvmnt Normal  GASTRO:   Nausea NA   Vomiting NA      OB History    Para Term  AB Living   5 3 3   1 3   SAB TAB Ectopic Multiple Live Births   1     0 3      # Outcome Date GA Lbr Mendoza/2nd Weight Sex Delivery Anes PTL Lv   5 Current            4 Term 17 40w0d 13:26 / 00:16 3.43 kg (7 lb 9 oz) F  EPI N SEBASTIAN   3 Term 14 40w5d  3.711 kg (8 lb 2.9 oz)   EPI N SEBASTIAN      Complications: , delivered   2 SAB 14 8w0d       DEC   1 Term 13 38w0d  2.863 kg (6 lb 5 oz) F CS-Unspec   SEBASTIAN      Birth Comments: long labor       Dating reviewed  Allergies and problem list reviewed and updated  Medical and surgical history reviewed  Prenatal labs reviewed and updated    PHYSICAL EXAM  /72   Wt 93.3 kg (205 lb 12.8 oz)   LMP 2018   BMI 40.19 kg/m²     GENERAL: No acute distress  HEENT: Normocephalic, atraumatic  NEURO: Alert and oriented x3  PSYCH: Normal mood and affect  PULMONARY: Non-labored respiration; no tachypnea  ABD: Soft, gravid, nontender; no hernia or hepatosplenomegaly      ASSESSMENT AND PLAN    pregnancy Problems (from 19 to present)     Problem Noted Resolved    Pregnancy with one fetus in third trimester 10/2/2019 by Felicia Dukes CNM No    Overview Signed 10/2/2019  4:32 PM by Felicia Dukes CNM     Prepregnancy BMI: 34  Pap:  10/2018 - NILM  Dating - per LMP  U/S - Normal anatomy  Aneuploidy screening -   (O POS)  GDM screen - refused  Vaccines - [ ]Flu [ ]TDAP  GBS negative  [x]Consents  Contraception -  Peds -   Circ -            Iron deficiency anemia during  pregnancy 2019 by Yenny Bonilla CNM No         H/H: 9. 3wks ago       Plan repeat CBC in labor      Obesity in pregnancy 2019 by Felicia Dukes CNM No    History of Postpartum psychosis 2018 by Richie Villafana MD No    Previous  section--desires TOLAC 2014 by Dary Arguello CNM No    Overview Addendum 2017  6:09 PM by Maira Toure CNM     Desires v-pam, documented LTCS  Has had  x1             VE at patient's request today (unchanged - 1.5/70/-3). Reviewed s/s active labor.  Pt requests IOL at 41wks - order placed, will schedule with L&D unit. Discussed IOL protocol, will likely start with mcgowan bulb and proceed with pitocin, reviewed continuous EFM and IV access.  Reviewed warning signs, normal FM,  labor precautions, and how/when to call.    Follow-up: 1 week, call or present sooner SIMON Berrios CNM / SABINA Avina

## 2019-10-06 NOTE — PROGRESS NOTES
24 y.o. female  at 40w4d   Reports + FM, denies VB, LOF or CTX  Patient upset that vaginal swab was NOT sent at last visit  States she is transferring care to [facility]'s because she wants a water-birth (she is a TOLAC/  patient and upset she can't water birth at our hospital)  TWG: no weight gain   Pelvic exam unremarkable, Affirm swab obtained--call or my [facility] with results.  Reviewed warning signs, normal FKCs,  labor precautions and how/when to call.  RTC x PRN wks, call or present sooner prn.  Yenny Bonilla CNM, MSN    Patient to fax request for records if needed, but likely unnecessary due to SAME system.

## 2019-10-07 ENCOUNTER — ANESTHESIA EVENT (OUTPATIENT)
Dept: OBSTETRICS AND GYNECOLOGY | Facility: OTHER | Age: 24
DRG: 832 | End: 2019-10-07
Payer: COMMERCIAL

## 2019-10-07 ENCOUNTER — HOSPITAL ENCOUNTER (INPATIENT)
Facility: OTHER | Age: 24
LOS: 1 days | Discharge: HOME | DRG: 832 | End: 2019-10-08
Attending: OBSTETRICS & GYNECOLOGY | Admitting: STUDENT IN AN ORGANIZED HEALTH CARE EDUCATION/TRAINING PROGRAM
Payer: COMMERCIAL

## 2019-10-07 DIAGNOSIS — O34.219 VBAC, DELIVERED, CURRENT HOSPITALIZATION: ICD-10-CM

## 2019-10-07 DIAGNOSIS — O34.219 HX SUCCESSFUL VBAC (VAGINAL BIRTH AFTER CESAREAN), CURRENTLY PREGNANT: ICD-10-CM

## 2019-10-07 DIAGNOSIS — Z3A.40 40 WEEKS GESTATION OF PREGNANCY: ICD-10-CM

## 2019-10-07 DIAGNOSIS — Z37.9 NORMAL LABOR: Primary | ICD-10-CM

## 2019-10-07 LAB
ABO + RH BLD: NORMAL
BASOPHILS # BLD AUTO: 0.04 K/UL (ref 0–0.2)
BASOPHILS NFR BLD: 0.3 % (ref 0–1.9)
BLD GP AB SCN CELLS X3 SERPL QL: NORMAL
DIFFERENTIAL METHOD: ABNORMAL
EOSINOPHIL # BLD AUTO: 0.1 K/UL (ref 0–0.5)
EOSINOPHIL NFR BLD: 0.4 % (ref 0–8)
ERYTHROCYTE [DISTWIDTH] IN BLOOD BY AUTOMATED COUNT: 14.8 % (ref 11.5–14.5)
HCT VFR BLD AUTO: 30.3 % (ref 37–48.5)
HGB BLD-MCNC: 9.5 G/DL (ref 12–16)
IMM GRANULOCYTES # BLD AUTO: 0.21 K/UL (ref 0–0.04)
IMM GRANULOCYTES NFR BLD AUTO: 1.5 % (ref 0–0.5)
LYMPHOCYTES # BLD AUTO: 2.8 K/UL (ref 1–4.8)
LYMPHOCYTES NFR BLD: 20.1 % (ref 18–48)
MCH RBC QN AUTO: 26.2 PG (ref 27–31)
MCHC RBC AUTO-ENTMCNC: 31.4 G/DL (ref 32–36)
MCV RBC AUTO: 84 FL (ref 82–98)
MONOCYTES # BLD AUTO: 0.7 K/UL (ref 0.3–1)
MONOCYTES NFR BLD: 4.7 % (ref 4–15)
NEUTROPHILS # BLD AUTO: 10.3 K/UL (ref 1.8–7.7)
NEUTROPHILS NFR BLD: 73 % (ref 38–73)
NRBC BLD-RTO: 0 /100 WBC
PLATELET # BLD AUTO: 197 K/UL (ref 150–350)
PMV BLD AUTO: 12.8 FL (ref 9.2–12.9)
RBC # BLD AUTO: 3.62 M/UL (ref 4–5.4)
WBC # BLD AUTO: 14.06 K/UL (ref 3.9–12.7)

## 2019-10-07 PROCEDURE — 72100003 HC LABOR CARE, EA. ADDL. 8 HRS

## 2019-10-07 PROCEDURE — 63600175 PHARM REV CODE 636 W HCPCS: Performed by: ADVANCED PRACTICE MIDWIFE

## 2019-10-07 PROCEDURE — 59025 PR FETAL 2N-STRESS TEST: ICD-10-PCS | Mod: 26,,, | Performed by: OBSTETRICS & GYNECOLOGY

## 2019-10-07 PROCEDURE — 0502F PR SUBSEQUENT PRENATAL CARE: ICD-10-PCS | Mod: ,,, | Performed by: ADVANCED PRACTICE MIDWIFE

## 2019-10-07 PROCEDURE — 27200710 HC EPIDURAL INFUSION PUMP SET: Performed by: ANESTHESIOLOGY

## 2019-10-07 PROCEDURE — 85025 COMPLETE CBC W/AUTO DIFF WBC: CPT

## 2019-10-07 PROCEDURE — 59409 OBSTETRICAL CARE: CPT | Mod: QY,,, | Performed by: ANESTHESIOLOGY

## 2019-10-07 PROCEDURE — 99282 PR EMERGENCY DEPT VISIT,LEVEL II: ICD-10-PCS | Mod: 25,,, | Performed by: OBSTETRICS & GYNECOLOGY

## 2019-10-07 PROCEDURE — 59610 PR ROUT OB CARE,VAG DELIV,PREV C-SEC: ICD-10-PCS | Mod: GB,,, | Performed by: ADVANCED PRACTICE MIDWIFE

## 2019-10-07 PROCEDURE — 99285 EMERGENCY DEPT VISIT HI MDM: CPT | Mod: 25

## 2019-10-07 PROCEDURE — 62326 NJX INTERLAMINAR LMBR/SAC: CPT | Performed by: STUDENT IN AN ORGANIZED HEALTH CARE EDUCATION/TRAINING PROGRAM

## 2019-10-07 PROCEDURE — 72200004 HC VAGINAL DELIVERY LEVEL I

## 2019-10-07 PROCEDURE — 11000001 HC ACUTE MED/SURG PRIVATE ROOM

## 2019-10-07 PROCEDURE — 59025 FETAL NON-STRESS TEST: CPT | Mod: 26,,, | Performed by: OBSTETRICS & GYNECOLOGY

## 2019-10-07 PROCEDURE — 59025 FETAL NON-STRESS TEST: CPT

## 2019-10-07 PROCEDURE — 86901 BLOOD TYPING SEROLOGIC RH(D): CPT

## 2019-10-07 PROCEDURE — 25000003 PHARM REV CODE 250: Performed by: ADVANCED PRACTICE MIDWIFE

## 2019-10-07 PROCEDURE — 51702 INSERT TEMP BLADDER CATH: CPT

## 2019-10-07 PROCEDURE — 59409 PRA ETRICAL CARE,VAG DELIV ONLY: ICD-10-PCS | Mod: QY,,, | Performed by: ANESTHESIOLOGY

## 2019-10-07 PROCEDURE — 25000003 PHARM REV CODE 250: Performed by: STUDENT IN AN ORGANIZED HEALTH CARE EDUCATION/TRAINING PROGRAM

## 2019-10-07 PROCEDURE — 99282 EMERGENCY DEPT VISIT SF MDM: CPT | Mod: 25,,, | Performed by: OBSTETRICS & GYNECOLOGY

## 2019-10-07 PROCEDURE — 0502F SUBSEQUENT PRENATAL CARE: CPT | Mod: ,,, | Performed by: ADVANCED PRACTICE MIDWIFE

## 2019-10-07 PROCEDURE — C1751 CATH, INF, PER/CENT/MIDLINE: HCPCS | Performed by: ANESTHESIOLOGY

## 2019-10-07 RX ORDER — FERROUS SULFATE 325(65) MG
325 TABLET, DELAYED RELEASE (ENTERIC COATED) ORAL DAILY
Status: DISCONTINUED | OUTPATIENT
Start: 2019-10-07 | End: 2019-10-08 | Stop reason: HOSPADM

## 2019-10-07 RX ORDER — ONDANSETRON 8 MG/1
8 TABLET, ORALLY DISINTEGRATING ORAL EVERY 8 HOURS PRN
Status: DISCONTINUED | OUTPATIENT
Start: 2019-10-07 | End: 2019-10-08 | Stop reason: HOSPADM

## 2019-10-07 RX ORDER — ACETAMINOPHEN 325 MG/1
650 TABLET ORAL EVERY 6 HOURS PRN
Status: DISCONTINUED | OUTPATIENT
Start: 2019-10-07 | End: 2019-10-08 | Stop reason: HOSPADM

## 2019-10-07 RX ORDER — SIMETHICONE 80 MG
1 TABLET,CHEWABLE ORAL 4 TIMES DAILY PRN
Status: DISCONTINUED | OUTPATIENT
Start: 2019-10-07 | End: 2019-10-08 | Stop reason: HOSPADM

## 2019-10-07 RX ORDER — FENTANYL/BUPIVACAINE/NS/PF 2MCG/ML-.1
PLASTIC BAG, INJECTION (ML) INJECTION
Status: COMPLETED
Start: 2019-10-07 | End: 2019-10-07

## 2019-10-07 RX ORDER — CALCIUM CARBONATE 200(500)MG
500 TABLET,CHEWABLE ORAL 3 TIMES DAILY PRN
Status: DISCONTINUED | OUTPATIENT
Start: 2019-10-07 | End: 2019-10-08 | Stop reason: HOSPADM

## 2019-10-07 RX ORDER — SODIUM CHLORIDE 9 MG/ML
INJECTION, SOLUTION INTRAVENOUS
Status: DISCONTINUED | OUTPATIENT
Start: 2019-10-07 | End: 2019-10-08 | Stop reason: HOSPADM

## 2019-10-07 RX ORDER — OXYTOCIN/RINGER'S LACTATE 30/500 ML
PLASTIC BAG, INJECTION (ML) INTRAVENOUS
Status: DISPENSED
Start: 2019-10-07 | End: 2019-10-08

## 2019-10-07 RX ORDER — FENTANYL/BUPIVACAINE/NS/PF 2MCG/ML-.1
PLASTIC BAG, INJECTION (ML) INJECTION
Status: DISCONTINUED | OUTPATIENT
Start: 2019-10-07 | End: 2019-10-07

## 2019-10-07 RX ORDER — SODIUM CHLORIDE, SODIUM LACTATE, POTASSIUM CHLORIDE, CALCIUM CHLORIDE 600; 310; 30; 20 MG/100ML; MG/100ML; MG/100ML; MG/100ML
INJECTION, SOLUTION INTRAVENOUS CONTINUOUS
Status: DISCONTINUED | OUTPATIENT
Start: 2019-10-07 | End: 2019-10-08 | Stop reason: HOSPADM

## 2019-10-07 RX ORDER — MISOPROSTOL 200 UG/1
800 TABLET ORAL
Status: DISCONTINUED | OUTPATIENT
Start: 2019-10-07 | End: 2019-10-07

## 2019-10-07 RX ORDER — FAMOTIDINE 10 MG/ML
20 INJECTION INTRAVENOUS ONCE
Status: DISCONTINUED | OUTPATIENT
Start: 2019-10-07 | End: 2019-10-08 | Stop reason: HOSPADM

## 2019-10-07 RX ORDER — HYDROCORTISONE 25 MG/G
CREAM TOPICAL 3 TIMES DAILY PRN
Status: DISCONTINUED | OUTPATIENT
Start: 2019-10-07 | End: 2019-10-08 | Stop reason: HOSPADM

## 2019-10-07 RX ORDER — DIPHENHYDRAMINE HCL 25 MG
25 CAPSULE ORAL EVERY 4 HOURS PRN
Status: DISCONTINUED | OUTPATIENT
Start: 2019-10-07 | End: 2019-10-08 | Stop reason: HOSPADM

## 2019-10-07 RX ORDER — DIPHENHYDRAMINE HYDROCHLORIDE 50 MG/ML
25 INJECTION INTRAMUSCULAR; INTRAVENOUS EVERY 4 HOURS PRN
Status: DISCONTINUED | OUTPATIENT
Start: 2019-10-07 | End: 2019-10-08 | Stop reason: HOSPADM

## 2019-10-07 RX ORDER — OXYTOCIN/RINGER'S LACTATE 30/500 ML
95 PLASTIC BAG, INJECTION (ML) INTRAVENOUS ONCE
Status: COMPLETED | OUTPATIENT
Start: 2019-10-07 | End: 2019-10-07

## 2019-10-07 RX ORDER — FENTANYL/BUPIVACAINE/NS/PF 2MCG/ML-.1
PLASTIC BAG, INJECTION (ML) INJECTION CONTINUOUS PRN
Status: DISCONTINUED | OUTPATIENT
Start: 2019-10-07 | End: 2019-10-07

## 2019-10-07 RX ORDER — DOCUSATE SODIUM 100 MG/1
200 CAPSULE, LIQUID FILLED ORAL 2 TIMES DAILY PRN
Status: DISCONTINUED | OUTPATIENT
Start: 2019-10-07 | End: 2019-10-08 | Stop reason: HOSPADM

## 2019-10-07 RX ORDER — NAPROXEN 500 MG/1
500 TABLET ORAL EVERY 8 HOURS PRN
Status: DISCONTINUED | OUTPATIENT
Start: 2019-10-07 | End: 2019-10-08 | Stop reason: HOSPADM

## 2019-10-07 RX ORDER — SODIUM CITRATE AND CITRIC ACID MONOHYDRATE 334; 500 MG/5ML; MG/5ML
30 SOLUTION ORAL ONCE
Status: DISCONTINUED | OUTPATIENT
Start: 2019-10-07 | End: 2019-10-08 | Stop reason: HOSPADM

## 2019-10-07 RX ADMIN — ACETAMINOPHEN 650 MG: 325 TABLET, FILM COATED ORAL at 08:10

## 2019-10-07 RX ADMIN — NAPROXEN 500 MG: 500 TABLET ORAL at 08:10

## 2019-10-07 RX ADMIN — ACETAMINOPHEN 650 MG: 325 TABLET, FILM COATED ORAL at 05:10

## 2019-10-07 RX ADMIN — SODIUM CHLORIDE, SODIUM LACTATE, POTASSIUM CHLORIDE, AND CALCIUM CHLORIDE: .6; .31; .03; .02 INJECTION, SOLUTION INTRAVENOUS at 07:10

## 2019-10-07 RX ADMIN — Medication 2 ML: at 06:10

## 2019-10-07 RX ADMIN — Medication 10 ML/HR: at 07:10

## 2019-10-07 RX ADMIN — Medication 95 MILLI-UNITS/MIN: at 02:10

## 2019-10-07 NOTE — PROGRESS NOTES
[facility]  Obstetrics  Labor Progress Note    Patient Name: Lizbeth Barbosa  MRN: [id number]  Admission Date: 10/7/2019  Hospital Length of Stay: 0 days  Attending Physician: No att. providers found  Primary Care Provider: Susie Kim MD    Subjective:     Principal Problem:Normal labor    Interval History:  Lizbeth is a 24 y.o.  at 40w5d. She is doing well. Comfortable with epidural  Family members @ bedside    Objective:     Vital Signs (Most Recent):  Temp: 96.5 °F (35.8 °C) (10/07/19 0707)  Pulse: 77 (10/07/19 0457)  Resp: 18 (10/07/19 0455)  BP: 131/74 (10/07/19 0455)  SpO2: 98 % (10/07/19 0457) Vital Signs (24h Range):  Temp:  [96.5 °F (35.8 °C)-98 °F (36.7 °C)] 96.5 °F (35.8 °C)  Pulse:  [77-87] 77  Resp:  [18] 18  SpO2:  [98 %] 98 %  BP: (131)/(74) 131/74     Weight: 93.3 kg (205 lb 11 oz)  Body mass index is 40.17 kg/m².    FHT: 1140'sCat 1 (reassuring)  TOCO:  Q 5-6 minutes    Physical Exam    Cervical Exam:  Dilation:  8  Effacement:  90%  Station: -2  Presentation: Vertex intact     Significant Labs:  Lab Results   Component Value Date    GROUPTRH O POS 10/07/2019    HEPBSAG Negative 2019    STREPBCULT No Group B Streptococcus isolated 2019       I have personallly reviewed all pertinent lab results from the last 24 hours.    Assessment/Plan:     24 y.o. female  at 40w5d for:    Active Diagnoses:    Diagnosis Date Noted POA    PRINCIPAL PROBLEM:  Normal labor [O80, Z37.9] 2017 Not Applicable    Iron deficiency anemia during pregnancy [O99.019, D50.9] 2019 Yes    History of Postpartum psychosis [O99.345, F53.1] 2018 Yes    Bipolar II disorder with postpartum onset [O99.345, F31.81] 2018 Yes     Chronic    Previous  section--desires TOLAC [Z98.891] 2014 Not Applicable      Problems Resolved During this Admission:       Continue expectant management    Reassess prn    Sangita Yun CNM  Obstetrics  [facility]

## 2019-10-07 NOTE — ASSESSMENT & PLAN NOTE
Consider postpartum Psych consult prior to discharge home and/or discharge with psych appt  Close pp follow-up with primary ob/ CNM recommended (see bipolar dx above)

## 2019-10-07 NOTE — HOSPITAL COURSE
10/7/2019  0600 Admission [facility] 5-6/80/-2 Bulging BOW  Epidural placed  10/8/19 0845 pt desires d/c waiting for peds to round, discussed d/c care at home, f/u. Hx of PP psychosis, recommended zoloft prophylaxis,  pt declines, will f/u with her psychiatrist as needed,  present for discussion, warning s/s reviewed. al

## 2019-10-07 NOTE — PROGRESS NOTES
Patient's mother refused vit K and Erythromycin administration for her baby.  Patient's mother refused to hear benefits of both medications.   Refusal forms signed with Dr. Sandy NP.

## 2019-10-07 NOTE — HPI
Lizbeth Barbosa is a 24 y.o. female  at 40w5d with Estimated Date of Delivery: 10/2/19 based on LMP who presents to L&D c/o contractions/ labor, onset yesterday 10/6/2019 around 11pm , duration now contractions are strong and about every 3-5minutes having to breath throuigh them consistently. She reports + FM. Denies VB-admits to some pink mucous discharge, denies LOF. Accompanied by significant other and her three children to the [facility]/ Triage. Expecting a Boy!  Requesting an epidural at this time.  Last ate Tacos last pm around 6pm 10/6/19.     Pregnancy has been c/b:     Patient Active Problem List:     Previous  section--desires TOLAC     Tobacco use      Hx of successful      Bipolar II disorder with postpartum onset     History of Postpartum psychosis     Obesity in pregnancy     Iron deficiency anemia during pregnancy     Pregnancy with one fetus in third trimester

## 2019-10-07 NOTE — SUBJECTIVE & OBJECTIVE
Obstetric HPI:  Patient reports Date/time of onset: 11pm 10/6/19 now regular q3-5min contractions, active fetal movement, No vaginal bleeding , No loss of fluid     This pregnancy has been complicated by   Patient Active Problem List   Diagnosis    Previous  section--desires TOLAC    Tobacco use    , delivered, current hospitalization    Bipolar II disorder with postpartum onset    Comfort measures only status    History of Postpartum psychosis    Obesity in pregnancy    Iron deficiency anemia during pregnancy    Pregnancy with one fetus in third trimester         OB History    Para Term  AB Living   5 3 3 0 1 3   SAB TAB Ectopic Multiple Live Births   1 0 0 0 3      # Outcome Date GA Lbr Mendoza/2nd Weight Sex Delivery Anes PTL Lv   5 Current            4 Term 17 40w0d 13:26 / 00:16 3.43 kg (7 lb 9 oz) F  EPI N SEBASTIAN      Name: JUAN GOMEZ      Apgar1: 8  Apgar5: 9   3 Term 14 40w5d  3.711 kg (8 lb 2.9 oz)   EPI N SEBASTIAN      Complications: , delivered      Apgar1: 8  Apgar5: 9   2 SAB 14 8w0d       DEC   1 Term 13 38w0d  2.863 kg (6 lb 5 oz) F CS-Unspec   SEBASTIAN      Birth Comments: long labor     Past Medical History:   Diagnosis Date    ADHD (attention deficit hyperactivity disorder)     Anemia 10/17/2014    Anxiety     Bipolar II disorder with postpartum onset     Headache     History of psychiatric hospitalization     Hx of psychiatric care     Postpartum depression     Psychiatric problem     Psychosis     Sleep difficulties     Therapy      Past Surgical History:   Procedure Laterality Date    adnoids       SECTION      x 1    TONSILLECTOMY      tonsils           (Not in a hospital admission)    Review of patient's allergies indicates:   Allergen Reactions    Latex, natural rubber Rash        Family History     Problem Relation (Age of Onset)    ADD / ADHD Sister, Sister    Depression Sister    Drug abuse Father    No  Known Problems Mother, Daughter, Sister, Daughter        Tobacco Use    Smoking status: Current Every Day Smoker     Packs/day: 1.00     Years: 2.00     Pack years: 2.00     Types: Cigarettes    Smokeless tobacco: Never Used   Substance and Sexual Activity    Alcohol use: Yes     Alcohol/week: 9.0 standard drinks     Types: 9 Glasses of wine per week     Comment: 3x a week    Drug use: No    Sexual activity: Yes     Partners: Male     Birth control/protection: None     Review of Systems   Constitutional: Negative for activity change, appetite change and unexpected weight change.   HENT: Negative.    Respiratory: Negative for shortness of breath.    Cardiovascular: Negative for chest pain and palpitations.   Gastrointestinal: Positive for abdominal pain. Negative for diarrhea, nausea and vomiting.   Endocrine: Negative for diabetes.   Genitourinary: Positive for vaginal discharge. Negative for genital sores and urinary incontinence.   Musculoskeletal: Negative for back pain.   Integumentary:  Negative for rash, acne, hair changes and breast tenderness. Negative.   Neurological: Negative for headaches.   Hematological: Negative.    Psychiatric/Behavioral: Negative for depression. The patient is not nervous/anxious.    All other systems reviewed and are negative.  Breast: negative.  Negative for tenderness     Objective:     Vital Signs (Most Recent):  Temp: 98 °F (36.7 °C) (10/07/19 0455)  Pulse: 77 (10/07/19 0457)  Resp: 18 (10/07/19 0455)  BP: 131/74 (10/07/19 0455)  SpO2: 98 % (10/07/19 0457) Vital Signs (24h Range):  Temp:  [98 °F (36.7 °C)] 98 °F (36.7 °C)  Pulse:  [77-87] 77  Resp:  [18] 18  SpO2:  [98 %] 98 %  BP: (131)/(74) 131/74     Weight: 93.3 kg (205 lb 11 oz)  Body mass index is 40.17 kg/m².    FHT: 135-140, moderate variability, no decels, Cat 1 (reassuring)  TOCO: Q 3-5 minutes, 60-90 sec, mod-strong to palp    Physical Exam:   Constitutional: She is oriented to person, place, and time. She  appears well-developed and well-nourished.    HENT:   Head: Normocephalic and atraumatic.    Eyes: Conjunctivae are normal.    Neck: Normal range of motion. Neck supple.    Cardiovascular: Normal rate, regular rhythm and normal heart sounds.     Pulmonary/Chest: Effort normal and breath sounds normal.        Abdominal: Soft.     Genitourinary: Vagina normal and uterus normal.           Musculoskeletal: Normal range of motion and moves all extremeties.       Neurological: She is alert and oriented to person, place, and time.    Skin: Skin is warm and dry.    Psychiatric: She has a normal mood and affect. Her behavior is normal. Judgment and thought content normal.       Cervix:  Dilation:  5  Effacement:  75%  Station: -2  Presentation: Vertex     Significant Labs:  Lab Results   Component Value Date    GROUPTRH O POS 07/02/2019    HEPBSAG Negative 07/02/2019    STREPBCULT No Group B Streptococcus isolated 09/03/2019       I have personallly reviewed all pertinent lab results from the last 24 hours.

## 2019-10-07 NOTE — PROGRESS NOTES
[facility]  Obstetrics  Labor Progress Note    Patient Name: Lizbeth Barbosa  MRN: [id number]  Admission Date: 10/7/2019  Hospital Length of Stay: 0 days  Attending Physician: No att. providers found  Primary Care Provider: Susie Kim MD    Subjective:     Principal Problem:Normal labor    Interval History:  Lizbeth is a 24 y.o.  at 40w5d. She is doing well. Still feels rectal pressure with ctx.      Objective:     Vital Signs (Most Recent):  Temp: 96.5 °F (35.8 °C) (10/07/19 0707)  Pulse: 66 (10/07/19 1041)  Resp: 18 (10/07/19 0455)  BP: 127/62 (10/07/19 1041)  SpO2: 98 % (10/07/19 1010) Vital Signs (24h Range):  Temp:  [96.4 °F (35.8 °C)-98 °F (36.7 °C)] 96.5 °F (35.8 °C)  Pulse:  [66-87] 66  Resp:  [18] 18  SpO2:  [96 %-98 %] 98 %  BP: (110-142)/(53-74) 127/62     Weight: 93.3 kg (205 lb 11 oz)  Body mass index is 40.17 kg/m².    FHT: 140'sCat 1 (reassuring)  TOCO:  Q 3-4 minutes    Physical Exam    Cervical Exam:  Dilation:  9  Effacement:  100%  Station: 0  Presentation: Vertex     Significant Labs:  Lab Results   Component Value Date    GROUPTRH O POS 10/07/2019    HEPBSAG Negative 2019    STREPBCULT No Group B Streptococcus isolated 2019       I have personallly reviewed all pertinent lab results from the last 24 hours.    Assessment/Plan:     24 y.o. female  at 40w5d for:    Active Diagnoses:    Diagnosis Date Noted POA    PRINCIPAL PROBLEM:  Normal labor [O80, Z37.9] 2017 Not Applicable    Iron deficiency anemia during pregnancy [O99.019, D50.9] 2019 Yes    History of Postpartum psychosis [O99.345, F53.1] 2018 Yes    Bipolar II disorder with postpartum onset [O99.345, F31.81] 2018 Yes     Chronic    Previous  section--desires TOLAC [Z98.891] 2014 Not Applicable      Problems Resolved During this Admission:       Continue expectant management  Reassess prn      Sangita Yun CNM  Obstetrics  [facility]

## 2019-10-07 NOTE — PROGRESS NOTES
[facility]  Obstetrics  Labor Progress Note    Patient Name: Lizbeth Barbosa  MRN: [id number]  Admission Date: 10/7/2019  Hospital Length of Stay: 0 days  Attending Physician: No att. providers found  Primary Care Provider: Susie Kim MD    Subjective:     Principal Problem:Normal labor    Interval History:  Lizbeth is a 24 y.o.  at 40w5d. She is doing well. Still comfortable  Family @ bedside     Objective:     Vital Signs (Most Recent):  Temp: 96.5 °F (35.8 °C) (10/07/19 0707)  Pulse: 77 (10/07/19 0457)  Resp: 18 (10/07/19 0455)  BP: 131/74 (10/07/19 0455)  SpO2: 98 % (10/07/19 0457) Vital Signs (24h Range):  Temp:  [96.5 °F (35.8 °C)-98 °F (36.7 °C)] 96.5 °F (35.8 °C)  Pulse:  [77-87] 77  Resp:  [18] 18  SpO2:  [98 %] 98 %  BP: (131)/(74) 131/74     Weight: 93.3 kg (205 lb 11 oz)  Body mass index is 40.17 kg/m².    FHT: 140s Cat 1 (reassuring)  TOCO:  Q 3-6 minutes    Physical Exam    Cervical Exam:  Dilation:  9  Effacement:  90%  Station: -2  Presentation: Vertex     Significant Labs:  Lab Results   Component Value Date    GROUPTRH O POS 10/07/2019    HEPBSAG Negative 2019    STREPBCULT No Group B Streptococcus isolated 2019       I have personallly reviewed all pertinent lab results from the last 24 hours.    Assessment/Plan:     24 y.o. female  at 40w5d for:    Active Diagnoses:    Diagnosis Date Noted POA    PRINCIPAL PROBLEM:  Normal labor [O80, Z37.9] 2017 Not Applicable    Iron deficiency anemia during pregnancy [O99.019, D50.9] 2019 Yes    History of Postpartum psychosis [O99.345, F53.1] 2018 Yes    Bipolar II disorder with postpartum onset [O99.345, F31.81] 2018 Yes     Chronic    Previous  section--desires TOLAC [Z98.891] 2014 Not Applicable      Problems Resolved During this Admission:       Continue expectant management      Sangita Yun CNM  Obstetrics  [facility]

## 2019-10-07 NOTE — L&D DELIVERY NOTE
[facility]  Vaginal Delivery   Operative Note    SUMMARY       delivery of live infant, was placed on mothers abdomen for skin to skin and bulb suctioning performed.  Infant delivered position MAU over intact perineum.  Nuchal cord: No.    Spontaneous delivery of placenta and IV pitocin given noting good uterine tone.  No lacerations noted.  Patient tolerated delivery well. Sponge needle and lap counted correctly x2.    Indications: Normal labor  Pregnancy complicated by:   Patient Active Problem List   Diagnosis    Previous  section--desires TOLAC    Tobacco use    Normal labor    Hx successful  (vaginal birth after ), currently pregnant    Bipolar II disorder with postpartum onset    Comfort measures only status    History of Postpartum psychosis    Obesity in pregnancy    Iron deficiency anemia during pregnancy    Pregnancy with one fetus in third trimester     Admitting GA: 40w5d    Delivery Information for Jama Barbosa    Birth information:  YOB: 2020   Time of birth: 1:45 PM   Sex: male   Head Delivery Date/Time:     Delivery type: , Spontaneous   Gestational Age: 40w5d    Delivery Providers    Delivering clinician:  Sangita Yun CNM           Measurements    Weight:    Length:           Apgars    Living status:  Living  Apgars:   1 min.:   5 min.:   10 min.:   15 min.:   20 min.:     Skin color:          Heart rate:          Reflex irritability:          Muscle tone:          Respiratory effort:          Total:                 Operative Delivery    Forceps attempted?:  No  Vacuum extractor attempted?:  No         Shoulder Dystocia    Shoulder dystocia present?:  No           Presentation    Presentation:  Vertex  Position:  Right Occiput Anterior           Interventions/Resuscitation    Method:  None, Bulb Suctioning       Cord    Vessels:  3 vessels  Complications:  None  Delayed Cord Clamping?:  Yes  Cord Blood  Disposition:  Sent with Baby  Gases Sent?:  No  Stem Cell Collection (by MD):  No       Placenta    Placenta delivery date/time:    Placenta removal:  Spontaneous  Placenta appearance:  Intact  Placenta disposition:  discarded           Labor Events:       labor:       Labor Onset Date/Time:         Dilation Complete Date/Time:         Start Pushing Date/Time:       Rupture Date/Time:              Rupture type:           Fluid Amount:        Fluid Color:        Fluid Odor:        Membrane Status (PeriCalm):        Rupture Date/Time (PeriCalm):        Fluid Amount (PeriCalm):        Fluid Color (PeriCalm):         steroids:       Antibiotics given for GBS:       Induction:       Indications for induction:        Augmentation:       Indications for augmentation:       Labor complications:       Additional complications:          Cervical ripening:                     Delivery:      Episiotomy: None     Indication for Episiotomy:       Perineal Lacerations: None Repaired:      Periurethral Laceration:   Repaired:     Labial Laceration:   Repaired:     Sulcus Laceration:   Repaired:     Vaginal Laceration:   Repaired:     Cervical Laceration:   Repaired:     Repair suture: None     Repair # of packets: 0     Last Value - EBL - Nursing (mL): 200     Sum - EBL - Nursing (mL): 200     Last Value - EBL - Anesthesia (mL):      Calculated QBL (mL):        Vaginal Sweep Performed: Yes     Surgicount Correct: No       Other providers:       Anesthesia    Method:  Epidural          Details (if applicable):  Trial of Labor      Categorization:      Priority:     Indications for :     Incision Type:       Additional  information:  Forceps:    Vacuum:    Breech:    Observed anomalies    Other (Comments): / of live male over intact perineum.  Placed on Mom's abdomen.  Cord clamped after pulsations ceased then cut by DAD. Placenta delivered grossly intact with 3VC.   Hemostasis per fundal massage and IV pitocin.  Both stable   cc

## 2019-10-07 NOTE — ANESTHESIA PREPROCEDURE EVALUATION
10/07/2019    Lizbeth Barbosa is a 24 y.o. 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 She has a PMH significant for anxiety and ADHD. Pt has a Hx of epidurals in the past without any issues. Denies being on blood thinners, problems with bleeding or spinal pathology.      OB History    Para Term  AB Living   5 3 3   1 3   SAB TAB Ectopic Multiple Live Births   1     0 3      # Outcome Date GA Lbr Mendoza/2nd Weight Sex Delivery Anes PTL Lv   5 Current            4 Term 17 40w0d 13:26 / 00:16 3.43 kg (7 lb 9 oz) F  EPI N SEBASTIAN   3 Term 14 40w5d  3.711 kg (8 lb 2.9 oz)   EPI N SEBASTIAN      Complications: , delivered   2 SAB 14 8w0d       DEC   1 Term 13 38w0d  2.863 kg (6 lb 5 oz) F CS-Unspec   SEBASTIAN      Birth Comments: long labor       Wt Readings from Last 1 Encounters:   10/07/19 0559 93.3 kg (205 lb 11 oz)       BP Readings from Last 3 Encounters:   10/07/19 131/74   10/02/19 122/72   10/01/19 122/76       Patient Active Problem List   Diagnosis    Previous  section--desires TOLAC    Tobacco use    Normal labor    , delivered, current hospitalization    Bipolar II disorder with postpartum onset    Comfort measures only status    History of Postpartum psychosis    Obesity in pregnancy    Iron deficiency anemia during pregnancy    Pregnancy with one fetus in third trimester       Past Surgical History:   Procedure Laterality Date    adnoids       SECTION      x 1    TONSILLECTOMY      tonsils         Social History     Socioeconomic History    Marital status:      Spouse name: Frederick Barbosa    Number of children: 3    Years of education: Not on file    Highest education level: Not on file   Occupational History    Occupation: [employer]   Social Needs    Financial resource strain: Not on file    Food insecurity:      Worry: Not on file     Inability: Not on file    Transportation needs:     Medical: Not on file     Non-medical: Not on file   Tobacco Use    Smoking status: Current Every Day Smoker     Packs/day: 1.00     Years: 2.00     Pack years: 2.00     Types: Cigarettes    Smokeless tobacco: Never Used   Substance and Sexual Activity    Alcohol use: Yes     Alcohol/week: 9.0 standard drinks     Types: 9 Glasses of wine per week     Comment: 3x a week    Drug use: No    Sexual activity: Yes     Partners: Male     Birth control/protection: None   Lifestyle    Physical activity:     Days per week: Not on file     Minutes per session: Not on file    Stress: Not on file   Relationships    Social connections:     Talks on phone: Not on file     Gets together: Not on file     Attends Alevism service: Not on file     Active member of club or organization: Not on file     Attends meetings of clubs or organizations: Not on file     Relationship status: Not on file   Other Topics Concern    Patient feels they ought to cut down on drinking/drug use Yes    Patient annoyed by others criticizing their drinking/drug use No    Patient has felt bad or guilty about drinking/drug use Yes    Patient has had a drink/used drugs as an eye opener in the AM Yes   Social History Narrative    Not on file         Chemistry        Component Value Date/Time     02/02/2018 0705    K 4.0 02/02/2018 0705     02/02/2018 0705    CO2 24 02/02/2018 0705    BUN 14 02/02/2018 0705    CREATININE 0.57 02/02/2018 0705    GLU 97 02/02/2018 0705        Component Value Date/Time    CALCIUM 9.0 02/02/2018 0705    ALKPHOS 122 02/02/2018 0705    AST 23 02/02/2018 0705    ALT 27 02/02/2018 0705    BILITOT 0.3 02/02/2018 0705    ESTGFRAFRICA >60.0 02/02/2018 0705    EGFRNONAA >60.0 02/02/2018 0705            Lab Results   Component Value Date    WBC 10.32 09/10/2019    HGB 9.4 (L) 09/10/2019    HCT 29.4 (L) 09/10/2019    MCV 84 09/10/2019    PLT  225 09/10/2019       No results for input(s): PT, INR, PROTIME, APTT in the last 72 hours.    Anesthesia Evaluation    I have reviewed the Patient Summary Reports.        Review of Systems  Anesthesia Hx:  History of prior surgery of interest to airway management or planning: Denies Family Hx of Anesthesia complications.   Denies Personal Hx of Anesthesia complications.   Social:  Non-Smoker    Hematology/Oncology:  Hematology Normal   Oncology Normal     EENT/Dental:EENT/Dental Normal   Cardiovascular:   Denies MI.          Pulmonary:   Denies COPD.  Denies Asthma.    Hepatic/GI:  Hepatic/GI Normal    Musculoskeletal:  Ankylosing Spondylitis    Neurological:   Denies CVA. Denies Seizures.    Endocrine:   Denies Diabetes.        Physical Exam  General:  Well nourished    Airway/Jaw/Neck:  Airway Findings: Mouth Opening: Normal General Airway Assessment: Adult  Mallampati: II         Dental:  DENTAL FINDINGS: Normal   Chest/Lungs:  Chest/Lungs Clear    Heart/Vascular:  Heart Findings: Normal Heart murmur: negative       Mental Status:  Mental Status Findings: Normal        Anesthesia Plan  Type of Anesthesia, risks & benefits discussed:  Anesthesia Type:  epidural, MAC, spinal, CSE  Patient's Preference:   Intra-op Monitoring Plan: standard ASA monitors  Intra-op Monitoring Plan Comments:   Post Op Pain Control Plan: epidural analgesia and intrathecal opioid  Post Op Pain Control Plan Comments:   Induction:   IV  Beta Blocker:  Patient is not currently on a Beta-Blocker (No further documentation required).       Informed Consent: Patient understands risks and agrees with Anesthesia plan.  Questions answered. Anesthesia consent signed with patient.  ASA Score: 2     Day of Surgery Review of History & Physical: I have interviewed and examined the patient. I have reviewed the patient's H&P dated:    H&P update referred to the provider.         Ready For Surgery From Anesthesia Perspective.

## 2019-10-07 NOTE — PROGRESS NOTES
[facility], [place], operated by [facility]  Obstetrics  Labor Progress Note    Patient Name: Lizbeth Barbosa  MRN: [id number]  Admission Date: 10/7/2019  Hospital Length of Stay: 0 days  Attending Physician: No att. providers found  Primary Care Provider:SABINA Cabral    Subjective:     Principal Problem:Normal labor    Interval History:  Lizbeth is a 24 y.o.  at 40w5d. She is doing well. Comfortable with epidural  Family @ bedside    Objective:     Vital Signs (Most Recent):  Temp: 96.5 °F (35.8 °C) (10/07/19 0707)  Pulse: 77 (10/07/19 0457)  Resp: 18 (10/07/19 0455)  BP: 131/74 (10/07/19 0455)  SpO2: 98 % (10/07/19 0457) Vital Signs (24h Range):  Temp:  [96.5 °F (35.8 °C)-98 °F (36.7 °C)] 96.5 °F (35.8 °C)  Pulse:  [77-87] 77  Resp:  [18] 18  SpO2:  [98 %] 98 %  BP: (131)/(74) 131/74     Weight: 93.3 kg (205 lb 11 oz)  Body mass index is 40.17 kg/m².    FHT: 14'sCat 1 (reassuring)  TOCO:  Q 4-6 minutes    Physical Exam    Cervical Exam:  Dilation:  6  Effacement:  90%  Station: -2  Presentation: Vertex intact     Significant Labs:  Lab Results   Component Value Date    GROUPTRH O POS 10/07/2019    HEPBSAG Negative 2019    STREPBCULT No Group B Streptococcus isolated 2019       I have personallly reviewed all pertinent lab results from the last 24 hours.    Assessment/Plan:     24 y.o. female  at 40w5d for:    Active Diagnoses:    Diagnosis Date Noted POA    PRINCIPAL PROBLEM:  Normal labor [O80, Z37.9] 2017 Not Applicable    Iron deficiency anemia during pregnancy [O99.019, D50.9] 2019 Yes    History of Postpartum psychosis [O99.345, F53.1] 2018 Yes    Bipolar II disorder with postpartum onset [O99.345, F31.81] 2018 Yes     Chronic    Previous  section--desires TOLAC [Z98.891] 2014 Not Applicable      Problems Resolved During this Admission:       Continue expectant management  Reassess prn    Sangita Yun CNM  Obstetrics  [facility]

## 2019-10-07 NOTE — H&P
[facility] ED  Obstetrics  History & Physical    Patient Name: Lizbeth Barbosa  MRN: [id number]  Admission Date: 10/7/2019  Primary Care Provider: Susie Kim MD    Subjective:     Principal Problem:Normal labor    History of Present Illness:  Lizbeth Barbosa is a 24 y.o. female  at 40w5d with Estimated Date of Delivery: 10/2/19 based on LMP who presents to L&D c/o contractions/ labor, onset yesterday 10/6/2019 around 11pm , duration now contractions are strong and about every 3-5minutes having to breath throuigh them consistently. She reports + FM. Denies VB-admits to some pink mucous discharge, denies LOF. Accompanied by significant other and her three children to the [facility]/ Triage. Expecting a Boy!  Requesting an epidural at this time.  Last ate Tacos last pm around 6pm 10/6/19.     Pregnancy has been c/b:     Patient Active Problem List:     Previous  section--desires TOLAC     Tobacco use      Hx of successful      Bipolar II disorder with postpartum onset     History of Postpartum psychosis     Obesity in pregnancy     Iron deficiency anemia during pregnancy     Pregnancy with one fetus in third trimester       Obstetric HPI:  Patient reports Date/time of onset: 11pm 10/6/19 now regular q3-5min contractions, active fetal movement, No vaginal bleeding , No loss of fluid     This pregnancy has been complicated by   Patient Active Problem List   Diagnosis    Previous  section--desires TOLAC    Tobacco use    , delivered, current hospitalization    Bipolar II disorder with postpartum onset    Comfort measures only status    History of Postpartum psychosis    Obesity in pregnancy    Iron deficiency anemia during pregnancy    Pregnancy with one fetus in third trimester         OB History    Para Term  AB Living   5 3 3 0 1 3   SAB TAB Ectopic Multiple Live Births   1 0 0 0 3      # Outcome Date GA Lbr Mendoza/2nd Weight Sex Delivery Anes PTL  Lv   5 Current            4 Term 17 40w0d 13:26 / 00:16 3.43 kg (7 lb 9 oz) F  EPI N SEBASTIAN      Name: JUAN GOMEZ      Apgar1: 8  Apgar5: 9   3 Term 14 40w5d  3.711 kg (8 lb 2.9 oz)   EPI N SEBASTIAN      Complications: , delivered      Apgar1: 8  Apgar5: 9   2 SAB 14 8w0d       DEC   1 Term 13 38w0d  2.863 kg (6 lb 5 oz) F CS-Unspec   SEBASTIAN      Birth Comments: long labor     Past Medical History:   Diagnosis Date    ADHD (attention deficit hyperactivity disorder)     Anemia 10/17/2014    Anxiety     Bipolar II disorder with postpartum onset     Headache     History of psychiatric hospitalization     Hx of psychiatric care     Postpartum depression     Psychiatric problem     Psychosis     Sleep difficulties     Therapy      Past Surgical History:   Procedure Laterality Date    adnoids       SECTION      x 1    TONSILLECTOMY      tonsils           (Not in a hospital admission)    Review of patient's allergies indicates:   Allergen Reactions    Latex, natural rubber Rash        Family History     Problem Relation (Age of Onset)    ADD / ADHD Sister, Sister    Depression Sister    Drug abuse Father    No Known Problems Mother, Daughter, Sister, Daughter        Tobacco Use    Smoking status: Current Every Day Smoker     Packs/day: 1.00     Years: 2.00     Pack years: 2.00     Types: Cigarettes    Smokeless tobacco: Never Used   Substance and Sexual Activity    Alcohol use: Yes     Alcohol/week: 9.0 standard drinks     Types: 9 Glasses of wine per week     Comment: 3x a week    Drug use: No    Sexual activity: Yes     Partners: Male     Birth control/protection: None     Review of Systems   Constitutional: Negative for activity change, appetite change and unexpected weight change.   HENT: Negative.    Respiratory: Negative for shortness of breath.    Cardiovascular: Negative for chest pain and palpitations.   Gastrointestinal: Positive for abdominal pain.  Negative for diarrhea, nausea and vomiting.   Endocrine: Negative for diabetes.   Genitourinary: Positive for vaginal discharge. Negative for genital sores and urinary incontinence.   Musculoskeletal: Negative for back pain.   Integumentary:  Negative for rash, acne, hair changes and breast tenderness. Negative.   Neurological: Negative for headaches.   Hematological: Negative.    Psychiatric/Behavioral: Negative for depression. The patient is not nervous/anxious.    All other systems reviewed and are negative.  Breast: negative.  Negative for tenderness     Objective:     Vital Signs (Most Recent):  Temp: 98 °F (36.7 °C) (10/07/19 0455)  Pulse: 77 (10/07/19 0457)  Resp: 18 (10/07/19 0455)  BP: 131/74 (10/07/19 0455)  SpO2: 98 % (10/07/19 0457) Vital Signs (24h Range):  Temp:  [98 °F (36.7 °C)] 98 °F (36.7 °C)  Pulse:  [77-87] 77  Resp:  [18] 18  SpO2:  [98 %] 98 %  BP: (131)/(74) 131/74     Weight: 93.3 kg (205 lb 11 oz)  Body mass index is 40.17 kg/m².    FHT: 135-140, moderate variability, no decels, Cat 1 (reassuring)  TOCO: Q 3-5 minutes, 60-90 sec, mod-strong to palp    Physical Exam:   Constitutional: She is oriented to person, place, and time. She appears well-developed and well-nourished.    HENT:   Head: Normocephalic and atraumatic.    Eyes: Conjunctivae are normal.    Neck: Normal range of motion. Neck supple.    Cardiovascular: Normal rate, regular rhythm and normal heart sounds.     Pulmonary/Chest: Effort normal and breath sounds normal.        Abdominal: Soft.     Genitourinary: Vagina normal and uterus normal.           Musculoskeletal: Normal range of motion and moves all extremeties.       Neurological: She is alert and oriented to person, place, and time.    Skin: Skin is warm and dry.    Psychiatric: She has a normal mood and affect. Her behavior is normal. Judgment and thought content normal.       Cervix:  Dilation:  5  Effacement:  75%  Station: -2  Presentation: Vertex     Significant  Labs:  Lab Results   Component Value Date    GROUPTRH O POS 2019    HEPBSAG Negative 2019    STREPBCULT No Group B Streptococcus isolated 2019       I have personallly reviewed all pertinent lab results from the last 24 hours.    Assessment/Plan:     24 y.o. female  at 40w5d for:    * Normal labor  Admission to L/D  Attending MD aware of patient status   Continuous EFM  Anticipate EFM    Iron deficiency anemia during pregnancy  Repeat H/H upon admission and postpartum    History of Postpartum psychosis  Consider postpartum Psych consult prior to discharge home and/or discharge with psych appt  Close pp follow-up with primary ob/ CNM recommended (see bipolar dx above)          Previous  section--desires TOLAC  Continuous EFM  Anticipate               Yenny Bonilla CNM  Obstetrics  [facility]    Yenny Bonilla CNM, MSN  10/07/2019  6:13 AM

## 2019-10-07 NOTE — ED PROVIDER NOTES
Encounter Date: 10/7/2019       History   No chief complaint on file.    Lizbeth Barbosa is a 24 y.o. female  at 40w5d with Estimated Date of Delivery: 10/2/19 based on LMP who presents to L&D c/o contractions/ labor, onset yesterday 10/6/2019 around 11pm , duration now contractions are strong and about every 3-5minutes having to breath throuigh them consistently. She reports + FM. Denies VB-admits to some pink mucous discharge, denies LOF. Accompanied by significant other and her three children to the [facility]/ Triage. Expecting a Boy!  Requesting an epidural at this time.  Last ate Tacos last pm around 6pm 10/6/19.    Pregnancy has been c/b:    Patient Active Problem List:     Previous  section--desires TOLAC     Tobacco use      Hx of successful      Bipolar II disorder with postpartum onset     History of Postpartum psychosis     Obesity in pregnancy     Iron deficiency anemia during pregnancy     Pregnancy with one fetus in third trimester              Review of patient's allergies indicates:   Allergen Reactions    Latex, natural rubber Rash     Past Medical History:   Diagnosis Date    ADHD (attention deficit hyperactivity disorder)     Anemia 10/17/2014    Anxiety     Bipolar II disorder with postpartum onset     Headache     History of psychiatric hospitalization     Hx of psychiatric care     Postpartum depression     Psychiatric problem     Psychosis     Sleep difficulties     Therapy      Past Surgical History:   Procedure Laterality Date    adnoids       SECTION      x 1    TONSILLECTOMY      tonsils       Family History   Problem Relation Age of Onset    No Known Problems Mother     Drug abuse Father     ADD / ADHD Sister     Depression Sister     No Known Problems Daughter     ADD / ADHD Sister     No Known Problems Sister     No Known Problems Daughter     Breast cancer Neg Hx     Cancer Neg Hx     Colon cancer Neg Hx     Diabetes Neg Hx      Eclampsia Neg Hx     Hypertension Neg Hx     Miscarriages / Stillbirths Neg Hx      labor Neg Hx     Ovarian cancer Neg Hx     Stroke Neg Hx      Social History     Tobacco Use    Smoking status: Current Every Day Smoker     Packs/day: 1.00     Years: 2.00     Pack years: 2.00     Types: Cigarettes    Smokeless tobacco: Never Used   Substance Use Topics    Alcohol use: Yes     Alcohol/week: 9.0 standard drinks     Types: 9 Glasses of wine per week     Comment: 3x a week    Drug use: No     Review of Systems   Constitutional: Negative for chills and fever.   HENT: Negative.    Eyes: Negative for visual disturbance.   Respiratory: Negative for shortness of breath.    Cardiovascular: Negative for chest pain and palpitations.   Gastrointestinal: Positive for abdominal pain. Negative for diarrhea, nausea and vomiting.        With contractions   Endocrine: Negative.    Genitourinary: Negative for dysuria and genital sores.        Denies vaginal bleeding or leaking fluid.    Musculoskeletal: Negative.    Skin: Negative for rash.   Allergic/Immunologic: Negative.    Neurological: Negative for dizziness, syncope and headaches.   Psychiatric/Behavioral: Negative for agitation, behavioral problems and suicidal ideas. The patient is not nervous/anxious.        Physical Exam     Initial Vitals   BP Pulse Resp Temp SpO2   10/07/19 0455 10/07/19 0455 10/07/19 0455 10/07/19 0455 10/07/19 0457   131/74 87 18 98 °F (36.7 °C) 98 %      MAP       --                Physical Exam    Vitals reviewed.  Constitutional: She appears well-developed and well-nourished.   HENT:   Head: Normocephalic and atraumatic.   Eyes: Conjunctivae are normal.   Neck: Normal range of motion. Neck supple.   Cardiovascular: Normal rate, regular rhythm and normal heart sounds.   Pulmonary/Chest: Breath sounds normal.   Abdominal: Soft. Bowel sounds are normal.   Genitourinary: Vagina normal and uterus normal.   Musculoskeletal: Normal range of  motion.   Neurological: She is alert and oriented to person, place, and time. She has normal strength.   Skin: Skin is warm and dry.   Psychiatric: She has a normal mood and affect. Her behavior is normal. Judgment and thought content normal.         ED Course   Fetal non-stress test  Date/Time: 10/7/2019 5:57 AM  Performed by: Yenny Bonilla CNM  Authorized by: Yenny Bonilla CNM     Nonstress Test:     Variability:  6-25 BPM    Decelerations:  None    Accelerations:  15 bpm    Acoustic Stimulator: No      Baseline:  135    Uterine Irritability: No      Contractions:  Regular    Contraction Frequency:  3-5  Biophysical Profile:     Nonstress Test Interpretation: reactive      Overall Impression:  Reassuring  Post-procedure:     Patient tolerance:  Patient tolerated the procedure well with no immediate complications      Labs Reviewed - No data to display       Imaging Results    None          Medical Decision Making:   History:   Old Records Summarized: records from clinic visits.       <> Summary of Records:   Significant Psych history  TOLAC candidate, Hx of successful  x2  Refused GTT screening-normal 3rd trimester growth scan  Initial Assessment:   Normal Labor/ Desires TOLAC  Category 1 FHR  ED Management:  OB Admit to Labor and Delivery  Desires Epidural  Dr. Lopez aware of patient status and agrees with patient plan of care.                      Clinical Impression:       ICD-10-CM ICD-9-CM   1. Normal labor O80 650    Z37.9    2. 40 weeks gestation of pregnancy Z3A.40 V22.2         Disposition:   Disposition: Admitted  Condition: Stable                        Yenny Bonilla CNM  10/07/19 0602

## 2019-10-07 NOTE — PROGRESS NOTES
[facility]  Obstetrics  Labor Progress Note    Patient Name: Lizbeth Barbosa  MRN: [id number]  Admission Date: 10/7/2019  Hospital Length of Stay: 0 days  Attending Physician: No att. providers found  Primary Care Provider: Susie Kim MD    Subjective:     Principal Problem:Normal labor    Interval History:  Lizbeth is a 24 y.o.  at 40w5d. She is doing well. C/O mild rectal pressure with ctx.      Objective:     Vital Signs (Most Recent):  Temp: 96.5 °F (35.8 °C) (10/07/19 0707)  Pulse: 66 (10/07/19 1041)  Resp: 18 (10/07/19 0455)  BP: 127/62 (10/07/19 1041)  SpO2: 98 % (10/07/19 1010) Vital Signs (24h Range):  Temp:  [96.4 °F (35.8 °C)-98 °F (36.7 °C)] 96.5 °F (35.8 °C)  Pulse:  [66-87] 66  Resp:  [18] 18  SpO2:  [96 %-98 %] 98 %  BP: (110-142)/(53-74) 127/62     Weight: 93.3 kg (205 lb 11 oz)  Body mass index is 40.17 kg/m².    FHT: 140'sCat 1 (reassuring)  TOCO:  Q 2-4 minutes    Physical Exam    Cervical Exam:  Dilation:  9  Effacement:  100%  Station: -1  Presentation: Vertex intact     Significant Labs:  Lab Results   Component Value Date    GROUPTRH O POS 10/07/2019    HEPBSAG Negative 2019    STREPBCULT No Group B Streptococcus isolated 2019       I have personallly reviewed all pertinent lab results from the last 24 hours.    Assessment/Plan:     24 y.o. female  at 40w5d for:    Active Diagnoses:    Diagnosis Date Noted POA    PRINCIPAL PROBLEM:  Normal labor [O80, Z37.9] 2017 Not Applicable    Iron deficiency anemia during pregnancy [O99.019, D50.9] 2019 Yes    History of Postpartum psychosis [O99.345, F53.1] 2018 Yes    Bipolar II disorder with postpartum onset [O99.345, F31.81] 2018 Yes     Chronic    Previous  section--desires TOLAC [Z98.891] 2014 Not Applicable      Problems Resolved During this Admission:       Recheck in 1 hr or prn    Sangita Yun CNM  Obstetrics  [facility]

## 2019-10-07 NOTE — DISCHARGE INSTRUCTIONS
Breastfeeding Discharge Instructions       Feed the baby at the earliest sign of hunger or comfort  o Hands to mouth, sucking motions  o Rooting or searching for something to suck on  o Dont wait for crying - it is a sign of distress     The feedings may be 8-12 times per 24hrs and will not follow a schedule   Avoid pacifiers and bottles for the first 4 weeks   Alternate the breast you start the feeding with, or start with the breast that feels the fullest   Switch breasts when the baby takes himself off the breast or falls asleep   Keep offering breasts until the baby looks full, no longer gives hunger signs, and stays asleep when placed on his back in the crib   If the baby is sleepy and wont wake for a feeding, put the baby skin-to-skin dressed in a diaper against the mothers bare chest   Sleep near your baby   The baby should be positioned and latched on to the breast correctly  o Chest-to-chest, chin in the breast  o Babys lips are flipped outward  o Babys mouth is stretched open wide like a shout  o Babys sucking should feel like tugging to the mother  - The baby should be drinking at the breast:  o You should hear swallowing or gulping throughout the feeding  o You should see milk on the babys lips when he comes off the breast  o Your breasts should be softer when the baby is finished feeding  o The baby should look relaxed at the end of feedings  o After the 4th day and your milk is in:  o The babys poop should turn bright yellow and be loose, watery, and seedy  o The baby should have at least 3-4 poops the size of the palm of your hand per day  o The baby should have at least 5-6 wet diapers per day  o The urine should be light yellow in color  You should drink when you are thirsty and eat a healthy diet when you are    hungry.     Take naps to get the rest you need.   Take medications and/or drink alcohol only with permission of your obstetrician    or the babys pediatrician.  You can  also call the [facility],   ([phone]), Monday-Friday, 8am-5pm Central time, to get the most   up-to-date evidence-based information on the use of medications during   pregnancy and breastfeeding.      The baby should be examined by a pediatrician at 3-5 days of age.  Once your   milk comes in, the baby should be gaining at least ½ - 1oz each day and should be back to birthweight no later than 10-14 days of age.          Community Resources    [facility] Breastfeeding Warmline: [phone]   Local [facility] clinics: provide incentives and breastpumps to eligible mothers  [employer]he [employer] (LLLI):  mother-to-mother support group website        www.Privcapl.[employer])  Local La Leche League mother-to-mother support groups:        www.[employer]        La Leche League [facility]   Dr. Levi Fisher website for latch videos and general information:        www.breastfeedinginc.ca  [facility] is a call center that provides information about the safety of taking medications while breastfeeding.  Call 1-932.917.6782, M-F, 8am-5pm, CT.  International Lactation Consultant Association provides resources for assistance:        www.ilca.org  [place] Breastfeeding Coalition provides informationand resources for parents  and the community    www.LaBreastfeedingSupport.org     Lynda Mcgowan is a mom-to-mom support group:                             www.[employer].com//breastfeedng-support/  Partners for Healthy Babies:  1-005-566-BABY(9150)  [employer]: a breastfeeding support group for women of color, [phone]

## 2019-10-07 NOTE — PROGRESS NOTES
[facility]  Obstetrics  Labor Progress Note    Patient Name: Lizbeth Barbosa  MRN: [id number]  Admission Date: 10/7/2019  Hospital Length of Stay: 0 days  Attending Physician: No att. providers found  Primary Care Provider: Susie Kim MD    Subjective:     Principal Problem:Normal labor    Interval History:  Lizbeth is a 24 y.o.  at 40w5d. She is doing well. C/O increased rectal pressure with ctx.    Objective:     Vital Signs (Most Recent):  Temp: 96.5 °F (35.8 °C) (10/07/19 0707)  Pulse: 66 (10/07/19 1041)  Resp: 18 (10/07/19 0455)  BP: 127/62 (10/07/19 1041)  SpO2: 98 % (10/07/19 1010) Vital Signs (24h Range):  Temp:  [96.4 °F (35.8 °C)-98 °F (36.7 °C)] 96.5 °F (35.8 °C)  Pulse:  [66-87] 66  Resp:  [18] 18  SpO2:  [96 %-98 %] 98 %  BP: (110-142)/(53-74) 127/62     Weight: 93.3 kg (205 lb 11 oz)  Body mass index is 40.17 kg/m².    FHT: 140'sCat 1 (reassuring)  TOCO:  Q 2-4 minutes    Physical Exam    Cervical Exam:  Dilation:  10  Effacement:  100%  Station: 2  Presentation: Vertex bulging bag     Significant Labs:  Lab Results   Component Value Date    GROUPTRH O POS 10/07/2019    HEPBSAG Negative 2019    STREPBCULT No Group B Streptococcus isolated 2019       I have personallly reviewed all pertinent lab results from the last 24 hours.    Assessment/Plan:     24 y.o. female  at 40w5d for:    Active Diagnoses:    Diagnosis Date Noted POA    PRINCIPAL PROBLEM:  Normal labor [O80, Z37.9] 2017 Not Applicable    Iron deficiency anemia during pregnancy [O99.019, D50.9] 2019 Yes    History of Postpartum psychosis [O99.345, F53.1] 2018 Yes    Bipolar II disorder with postpartum onset [O99.345, F31.81] 2018 Yes     Chronic    Previous  section--desires TOLAC [Z98.891] 2014 Not Applicable      Problems Resolved During this Admission:     Push with ctx.  Anticipate  ()    Sangita Yun CNM  Obstetrics  [facility]

## 2019-10-07 NOTE — ANESTHESIA PROCEDURE NOTES
CSE    Patient location during procedure: OB  Start time: 10/7/2019 6:41 AM  Timeout: 10/7/2019 6:40 AM  End time: 10/7/2019 7:00 AM    Staffing  Authorizing Provider: Charmaine Frazier MD  Performing Provider: Paola Rocha MD    Preanesthetic Checklist  Completed: patient identified, surgical consent, pre-op evaluation, timeout performed, IV checked, risks and benefits discussed and monitors and equipment checked  CSE  Patient position: sitting  Prep: ChloraPrep  Patient monitoring: heart rate, cardiac monitor, continuous pulse ox, continuous capnometry and frequent blood pressure checks  Approach: midline  Spinal Needle  Needle type: Franc   Needle gauge: 25 G  Needle length: 5 in  Epidural Needle  Injection technique: DONAVAN saline  Needle type: Tuohy   Needle gauge: 17 G  Needle length: 3.5 in  Needle insertion depth: 8 cm  Location: L3-4  Needle localization: anatomical landmarks  Catheter  Catheter type: Zume Life  Catheter size: 19 G  Catheter at skin depth: 12 cm  Test dose: lidocaine 1.5% with Epi 1-to-200,000  Additional Documentation: incremental injection, negative aspiration for CSF, negative aspiration for heme, no paresthesia on injection and negative test dose

## 2019-10-08 VITALS
TEMPERATURE: 97 F | BODY MASS INDEX: 40.38 KG/M2 | HEIGHT: 60 IN | OXYGEN SATURATION: 97 % | DIASTOLIC BLOOD PRESSURE: 59 MMHG | RESPIRATION RATE: 18 BRPM | SYSTOLIC BLOOD PRESSURE: 118 MMHG | HEART RATE: 70 BPM | WEIGHT: 205.69 LBS

## 2019-10-08 PROCEDURE — 25000003 PHARM REV CODE 250: Performed by: ADVANCED PRACTICE MIDWIFE

## 2019-10-08 PROCEDURE — 99238 PR HOSPITAL DISCHARGE DAY,<30 MIN: ICD-10-PCS | Mod: ,,, | Performed by: ADVANCED PRACTICE MIDWIFE

## 2019-10-08 PROCEDURE — 99238 HOSP IP/OBS DSCHRG MGMT 30/<: CPT | Mod: ,,, | Performed by: ADVANCED PRACTICE MIDWIFE

## 2019-10-08 RX ORDER — IBUPROFEN 600 MG/1
600 TABLET ORAL 3 TIMES DAILY
Qty: 30 TABLET | Refills: 0 | Status: SHIPPED | OUTPATIENT
Start: 2019-10-08 | End: 2022-11-16

## 2019-10-08 RX ADMIN — ACETAMINOPHEN 650 MG: 325 TABLET, FILM COATED ORAL at 05:10

## 2019-10-08 RX ADMIN — NAPROXEN 500 MG: 500 TABLET ORAL at 01:10

## 2019-10-08 RX ADMIN — DOCUSATE SODIUM 200 MG: 100 CAPSULE, LIQUID FILLED ORAL at 01:10

## 2019-10-08 RX ADMIN — NAPROXEN 500 MG: 500 TABLET ORAL at 05:10

## 2019-10-08 RX ADMIN — ACETAMINOPHEN 650 MG: 325 TABLET, FILM COATED ORAL at 01:10

## 2019-10-08 RX ADMIN — FERROUS SULFATE TAB EC 325 MG (65 MG FE EQUIVALENT) 325 MG: 325 (65 FE) TABLET DELAYED RESPONSE at 05:10

## 2019-10-08 NOTE — LACTATION NOTE
Pt independently latched infant to left breast, wide gape and deep latch achieved. With breast compression infant swallowing frequent. Pt c/o as she has had to wake infant up for feedings, encouraged frequent skin to skin and for all feedings, using hand expression and offering EBM via cup/spoon if infant not latching, encouraged pt to discuss with pediatrician if arousal for feedings continues.     Repeat TCB at 1500 today. Lactation discharge education completed. Plan of care is for pt to follow basic breastfeeding education, frequent feeding on demand, and to monitor baby's voids and stools. Breastfeeding guide, including First Alert survey, resource list, and lactation warmline phone number reviewed. Pt to notify doctor for maternal or infant concerns, as reviewed with LC. Pt verbalizes understanding and questions answered.        10/08/19 1200   Maternal Assessment   Breast Shape round   Breast Density full   Areola elastic   Nipples everted   Maternal Infant Feeding   Maternal Emotional State relaxed;independent   Infant Positioning cross-cradle   Signs of Milk Transfer audible swallow;infant jaw motion present   Pain with Feeding no   Nipple Shape After Feeding, Left round   Latch Assistance no   Lactation Referrals   Lactation Referrals outpatient lactation program;support group

## 2019-10-08 NOTE — LACTATION NOTE
Lactation Basics education completed. LC reviewed Breastfeeding Guide and encouraged tracking feeds and output. Encouraged use of STS, frequent feeds on demand, and avoiding artificial nipples. Pt verbalized understanding and questions answered. Pt is currently nursing 1yo daughter, discussed tandem feeding; which pt has done before with first 2 children. Toddler is nursing occasionally during the day, educated to bring infant to breast first then offer to toddler. Pt verbalized understanding.    Infant rousing and rooting, pt independently latched infant to right breast, wide gape and deep latch achieved. Infant needs stimulation and breast compression to stay active, occasionally coughing with strong flow. Pt denies pain. Support and encouragement provided.       10/07/19 1830   Maternal Assessment   Breast Shape round   Breast Density full   Areola elastic   Nipples everted   Maternal Infant Feeding   Maternal Emotional State relaxed;independent   Infant Positioning cross-cradle   Signs of Milk Transfer audible swallow;infant jaw motion present   Pain with Feeding no

## 2019-10-08 NOTE — DISCHARGE SUMMARY
[facility]  Obstetrics  Discharge Summary      Patient Name: Lizbeth Barbosa  MRN: [id number]  Admission Date: 10/7/2019  Hospital Length of Stay: 1 days  Discharge Date and Time:  10/08/2019 8:49 AM  Attending Physician: Gracie Moreno MD   Discharging Provider: Selena Lu CNM   Primary Care Provider: Susie Kim MD    HPI: Lizbeth Barbosa is a 24 y.o. female  at 40w5d with Estimated Date of Delivery: 10/2/19 based on LMP who presents to L&D c/o contractions/ labor, onset yesterday 10/6/2019 around 11pm , duration now contractions are strong and about every 3-5minutes having to breath throuigh them consistently. She reports + FM. Denies VB-admits to some pink mucous discharge, denies LOF. Accompanied by significant other and her three children to the [facility]/ Triage. Expecting a Boy!  Requesting an epidural at this time.  Last ate Tacos last pm around 6pm 10/6/19.     Pregnancy has been c/b:     Patient Active Problem List:     Previous  section--desires TOLAC     Tobacco use      Hx of successful      Bipolar II disorder with postpartum onset     History of Postpartum psychosis     Obesity in pregnancy     Iron deficiency anemia during pregnancy     Pregnancy with one fetus in third trimester       * No surgery found *     Hospital Course:   10/7/2019  0600 Admission SVE 5-6/80/-2 Bulging BOW  Epidural placed  10/8/19 0845 pt desires d/c waiting for peds to round, discussed d/c care at home, f/u. Hx of PP psychosis, recommended zoloft prophylaxis,  pt declines, will f/u with her psychiatrist as needed,  present for discussion, warning s/s reviewed. al     Consults (From admission, onward)        Status Ordering Provider     Consult to Lactation  Use PRN     Provider:  (Not yet assigned)    Acknowledged GONZALO SHERMAN     Inpatient consult to Anesthesiology  Once     Provider:  (Not yet assigned)    Acknowledged JENNIFER TAN     Inpatient consult to Social  Work  Once     Provider:  (Not yet assigned)    Acknowledged JENNIFER PARK          Final Active Diagnoses:    Diagnosis Date Noted POA    PRINCIPAL PROBLEM:  , delivered, current hospitalization [O34.219] 10/07/2019 No    Iron deficiency anemia during pregnancy [O99.019, D50.9] 2019 Yes    History of Postpartum psychosis [O99.345, F53.1] 2018 Yes    Bipolar II disorder with postpartum onset [O99.345, F31.81] 2018 Yes     Chronic    Hx successful  (vaginal birth after ), currently pregnant [O34.219] 2017 Yes    Previous  section--desires TOLAC [Z98.891] 2014 Not Applicable      Problems Resolved During this Admission:    Diagnosis Date Noted Date Resolved POA    Normal labor [O80, Z37.9] 2017 10/07/2019 Not Applicable        Labs:   CBC   Recent Labs   Lab 10/07/19  0610   WBC 14.06*   HGB 9.5*   HCT 30.3*          Feeding Method: breast    Immunizations     Date Immunization Status Dose Route/Site Given by    10/07/19 1544 MMR Incomplete 0.5 mL Subcutaneous/Left deltoid     10/07/19 1544 Tdap Incomplete 0.5 mL Intramuscular/Left deltoid           Delivery:    Episiotomy: None   Lacerations: None   Repair suture: None   Repair # of packets: 0   Blood loss (ml): 200     Birth information:  YOB: 2020   Time of birth: 1:45 PM   Sex: male   Delivery type: , Spontaneous   Gestational Age: 40w5d    Delivery Clinician:      Other providers:       Additional  information:  Forceps:    Vacuum:    Breech:    Observed anomalies      Living?:           APGARS  One minute Five minutes Ten minutes   Skin color:         Heart rate:         Grimace:         Muscle tone:         Breathing:         Totals: 9  9        Placenta: Delivered:       appearance    Pending Diagnostic Studies:     None          Discharged Condition: good    Disposition: Home or Self Care    Follow Up:  Follow-up Information     Follow up In 4 weeks.              call for any s/s of depression- excessive crying, loss of interest, failure to complete ADLs, feelings of harming self or others    Patient Instructions:      Diet Adult Regular     Notify your health care provider if you experience any of the following:  temperature >100.4     Notify your health care provider if you experience any of the following:  persistent nausea and vomiting or diarrhea     Notify your health care provider if you experience any of the following:  severe uncontrolled pain     Notify your health care provider if you experience any of the following:  severe persistent headache     Notify your health care provider if you experience any of the following:   Order Comments: Saturating 2-3 pads an hour, signs of depression-excessive crying, unable to eat or sleep     Activity as tolerated     Medications:  Current Discharge Medication List      START taking these medications    Details   ibuprofen (ADVIL,MOTRIN) 600 MG tablet Take 1 tablet (600 mg total) by mouth 3 (three) times daily.  Qty: 30 tablet, Refills: 0         CONTINUE these medications which have NOT CHANGED    Details   calcium carbonate (TUMS) 200 mg calcium (500 mg) chewable tablet Take 1 tablet by mouth once daily.      iron polysaccharides (NIFEREX) 150 mg iron Cap Take 1 capsule (150 mg total) by mouth 2 (two) times daily.  Qty: 60 capsule, Refills: 3    Associated Diagnoses: Iron deficiency anemia during pregnancy             Selena Lu CNM  Obstetrics  [facility]     Agree with above  Gracie Moreno M.D.

## 2019-10-08 NOTE — ANESTHESIA POSTPROCEDURE EVALUATION
Anesthesia Post Evaluation    Patient: Lizbeth Barbosa    Procedure(s) Performed: * No procedures listed *    Final Anesthesia Type: epidural  Patient location during evaluation: labor & delivery  Patient participation: Yes- Able to Participate  Level of consciousness: awake and alert and oriented  Post-procedure vital signs: reviewed and stable  Pain management: adequate  Airway patency: patent  PONV status at discharge: No PONV  Anesthetic complications: no      Cardiovascular status: blood pressure returned to baseline  Respiratory status: room air, unassisted and spontaneous ventilation  Hydration status: euvolemic  Follow-up not needed.          Vitals Value Taken Time   /59 10/8/2019  7:51 AM   Temp 36.3 °C (97.4 °F) 10/8/2019  7:51 AM   Pulse 70 10/8/2019  7:51 AM   Resp 18 10/8/2019  7:51 AM   SpO2 97 % 10/8/2019  7:51 AM         No case tracking events are documented in the log.      Pain/Solo Score: Pain Rating Prior to Med Admin: 4 (10/8/2019  5:37 AM)  Pain Rating Post Med Admin: 2 (10/8/2019  5:54 AM)

## 2019-10-08 NOTE — PLAN OF CARE
Based on pt's stated feeding goals, the Plan of care for today is for pt to do skin-to-skin, to feed frequently on demand, to observe for signs of effective milk transfer, to monitor voids and stools, and to call for assistance. Pt may breastfeed and/ or express breastmilk with hand expression, and provide EBM to baby with cup, or spoon, as needed. Pt verbalizes understanding.

## 2019-10-08 NOTE — PLAN OF CARE
Met with pt after consult ordered for history of bipolar d/o and post partum depression.     DC address: [address], [place] [address]  DC phone number: [phone]    SOCIAL WORK DISCHARGE PLANNING ASSESSMENT    Sw completed discharge planning assessment with pt at pt's bedside.  Pt was guarded and slow to warm-up. Education on the role of  was provided. Emotional support provided throughout assessment.    Pt reported a history of post partum depression with last pregnancy that she was hospitalized for. Pt did not want to discuss all the details surrounding the hospitalization. However, pt denied any symptoms of depression, SI or HI. Staff have reported pt has been providing appropriate care for . Pt offered resources for a psych md or therapist but pt reported she already has these resources and knows how to utilize them.    Commercial Insurance Coverage: [place] will likely be added to 's commercial policy.    Pediatrician: [facility] [place]      Essential Items: (includes car seat, crib/bassinet/pack-n-play, clothing, bottles, diapers, etc.)  Acquired     Transportation: Personal vehicle and Family/friends     Education: Postpartum Depression signs.     Resources Given: pt declined need for resources.    Potential Discharge Needs:  none       Ana Delgadillo LCSW    [facility]  [email or web address]    (phone) [phone] or  [address]. [phone]  (fax) [phone]

## 2019-11-04 ENCOUNTER — TELEPHONE (OUTPATIENT)
Dept: OBSTETRICS AND GYNECOLOGY | Facility: OTHER | Age: 24
End: 2019-11-04

## 2019-11-04 NOTE — TELEPHONE ENCOUNTER
Pt doing well. Pain is under control and pt is still breastfeeding. Baby has been to pediatrician and mom has f/u tomorrow. Pt has  as support. Pt has no questions or concerns at this time.

## 2019-11-05 ENCOUNTER — ROUTINE PRENATAL (OUTPATIENT)
Dept: OBSTETRICS AND GYNECOLOGY | Facility: CLINIC | Age: 24
End: 2019-11-05
Payer: COMMERCIAL

## 2019-11-05 ENCOUNTER — POSTPARTUM VISIT (OUTPATIENT)
Dept: OBSTETRICS AND GYNECOLOGY | Facility: CLINIC | Age: 24
End: 2019-11-05
Payer: COMMERCIAL

## 2019-11-05 VITALS
WEIGHT: 187.63 LBS | DIASTOLIC BLOOD PRESSURE: 62 MMHG | HEIGHT: 60 IN | BODY MASS INDEX: 36.84 KG/M2 | SYSTOLIC BLOOD PRESSURE: 104 MMHG | WEIGHT: 187.63 LBS | BODY MASS INDEX: 36.64 KG/M2

## 2019-11-05 DIAGNOSIS — Z34.90 PREGNANCY WITH ONE FETUS, ANTEPARTUM: Primary | ICD-10-CM

## 2019-11-05 PROCEDURE — 99999 PR PBB SHADOW E&M-EST. PATIENT-LVL II: ICD-10-PCS | Mod: PBBFAC,,, | Performed by: ADVANCED PRACTICE MIDWIFE

## 2019-11-05 PROCEDURE — 0502F SUBSEQUENT PRENATAL CARE: CPT | Mod: CPTII,S$GLB,, | Performed by: ADVANCED PRACTICE MIDWIFE

## 2019-11-05 PROCEDURE — 99999 PR PBB SHADOW E&M-EST. PATIENT-LVL II: CPT | Mod: PBBFAC,,, | Performed by: ADVANCED PRACTICE MIDWIFE

## 2019-11-05 PROCEDURE — 0502F PR SUBSEQUENT PRENATAL CARE: ICD-10-PCS | Mod: CPTII,S$GLB,, | Performed by: ADVANCED PRACTICE MIDWIFE

## 2019-11-05 NOTE — LETTER
November 5, 2019      [facility], [facility] [address]  [address] [place] [address]  Phone: [phone]  Fax: [phone]       Patient: Lizbeth Barbosa   YOB: 1995  Date of Visit: 11/05/2019    To Whom It May Concern:    Justine Barbosa  was at [facility] delivering her baby on 10/7/2019. She may return to work on 11/6/2019 with no restrictions. If you have any questions or concerns, or if I can be of further assistance, please do not hesitate to contact me.    Sincerely,        Wendy Gerhardt, CNM

## 2019-11-05 NOTE — PROGRESS NOTES
Postpartum Visit  Lizbeth Barbosa is a 24 y.o. female  is here for a postpartum visit. She is 4 weeks postpartum following a spontaneous vaginal delivery, of a male infant weighinlb 9oz, with Anesthesia: epidural. . The delivery was at 41w 0d.     Pregnancy was complicated by: none.      OB History    Para Term  AB Living   5 3 3   1 3   SAB TAB Ectopic Multiple Live Births   1     0 3      # Outcome Date GA Lbr Mendoza/2nd Weight Sex Delivery Anes PTL Lv   5 Current            4 Term 17 40w0d 13:26 / 00:16 3.43 kg (7 lb 9 oz) F  EPI N SEBASTIAN   3 Term 14 40w5d  3.711 kg (8 lb 2.9 oz)   EPI N SEBASTIAN      Complications: , delivered   2 SAB 14 8w0d       DEC   1 Term 13 38w0d  2.863 kg (6 lb 5 oz) F CS-Unspec   SEBASTIAN      Birth Comments: long labor       Postpartum course has been uncomplicated.   Bleeding staining only, just stopped. Bowel/ bladder function is normal.   Her last pap was .    Patient is not sexually active. Desired contraception method is none.  is getting Vastectomy  Postpartum depression screening: negative. EPDS score: 4      Baby's course has been uncomplicated. Baby is feeding by breast.     ROS:  GENERAL: No fever, chills, fatigability.  VULVAR: No pain, no lesions and no itching.  VAGINAL: No relaxation, no itching, no discharge, no abnormal bleeding and no lesions.  ABDOMEN: No abdominal pain. Denies nausea. Denies vomiting. No diarrhea. No constipation  BREAST: Denies pain. No lumps. No discharge.  URINARY: No incontinence, no nocturia, no frequency and no dysuria.  CARDIOVASCULAR: No chest pain. No shortness of breath. No leg cramps.  NEUROLOGICAL: No headaches. No vision changes.    Past Medical History:   Diagnosis Date    ADHD (attention deficit hyperactivity disorder)     Anemia 10/17/2014    Anxiety     Bipolar II disorder with postpartum onset     Headache     History of psychiatric hospitalization     Hx of psychiatric  care     Postpartum depression     Psychiatric problem     Psychosis     Sleep difficulties     Therapy      Past Surgical History:   Procedure Laterality Date    adnoids       SECTION      x 1    TONSILLECTOMY      tonsils       Review of patient's allergies indicates:   Allergen Reactions    Latex, natural rubber Rash       Current Outpatient Medications:     iron polysaccharides (NIFEREX) 150 mg iron Cap, Take 1 capsule (150 mg total) by mouth 2 (two) times daily., Disp: 60 capsule, Rfl: 3    calcium carbonate (TUMS) 200 mg calcium (500 mg) chewable tablet, Take 1 tablet by mouth once daily., Disp: , Rfl:     ibuprofen (ADVIL,MOTRIN) 600 MG tablet, Take 1 tablet (600 mg total) by mouth 3 (three) times daily. (Patient not taking: Reported on 2019), Disp: 30 tablet, Rfl: 0      Vitals:    19 1351   BP: 104/62       General appearance - alert, well appearing, and in no distress  Mental status - alert, oriented to person, place, and time  Skin - coloration normal for race, good turgor, warm to touch, no rashes  Abdomen - soft, nontender, nondistended, no masses or organomegaly  Pelvic - Cx; long closed and thick, uterus anteverted, NT, ML.  External genitalia postpartum: normal, well-healed, without lesions or masses.  Normal female hair distribution. Adequate perineal body. Urethral meatus without lesions or prolapse. Urethra: no masses, tenderness, or scarring.  Bladder: without tenderness or masses.  Vaginal mucosa moist and pink, normal rugae, without lesions, abnormal discharge, or foul odor.  Cervix pink, no lesions, no cervical motion tenderness.  Uterus: midline, non tender, smooth, not enlarged, not prolapsed  No adnexal masses or tenderness.  Extremities - no edema, redness or tenderness in the calves or thighs      There are no diagnoses linked to this encounter.    Discussed contraception - pt desires nothing,  getting vasectomy this month  Counseling regarding  resuming normal activities of exercise and work.  Postpartum precautions reviewed      Routine follow up in 1 yr

## 2020-11-11 ENCOUNTER — OFFICE VISIT (OUTPATIENT)
Dept: URGENT CARE | Facility: CLINIC | Age: 25
End: 2020-11-11
Payer: COMMERCIAL

## 2020-11-11 VITALS
TEMPERATURE: 98 F | DIASTOLIC BLOOD PRESSURE: 85 MMHG | SYSTOLIC BLOOD PRESSURE: 116 MMHG | OXYGEN SATURATION: 98 % | WEIGHT: 170 LBS | HEIGHT: 60 IN | BODY MASS INDEX: 33.38 KG/M2 | HEART RATE: 116 BPM

## 2020-11-11 DIAGNOSIS — J01.90 ACUTE BACTERIAL SINUSITIS: Primary | ICD-10-CM

## 2020-11-11 DIAGNOSIS — B96.89 ACUTE BACTERIAL SINUSITIS: Primary | ICD-10-CM

## 2020-11-11 DIAGNOSIS — H65.03 BILATERAL ACUTE SEROUS OTITIS MEDIA, RECURRENCE NOT SPECIFIED: ICD-10-CM

## 2020-11-11 DIAGNOSIS — F17.200 SMOKER: ICD-10-CM

## 2020-11-11 PROCEDURE — 99203 PR OFFICE/OUTPT VISIT, NEW, LEVL III, 30-44 MIN: ICD-10-PCS | Mod: S$GLB,,, | Performed by: INTERNAL MEDICINE

## 2020-11-11 PROCEDURE — 99203 OFFICE O/P NEW LOW 30 MIN: CPT | Mod: S$GLB,,, | Performed by: INTERNAL MEDICINE

## 2020-11-11 RX ORDER — AZITHROMYCIN 250 MG/1
250 TABLET, FILM COATED ORAL DAILY
Qty: 6 TABLET | Refills: 0 | Status: SHIPPED | OUTPATIENT
Start: 2020-11-11 | End: 2020-11-16

## 2020-11-11 RX ORDER — PREDNISONE 10 MG/1
10 TABLET ORAL DAILY
Qty: 5 TABLET | Refills: 0 | Status: SHIPPED | OUTPATIENT
Start: 2020-11-11 | End: 2020-11-16

## 2020-11-11 NOTE — PATIENT INSTRUCTIONS
Acute Bacterial Rhinosinusitis (ABRS)    Acute bacterial rhinosinusitis (ABRS) is an infection of your nasal cavity and sinuses. Its caused by bacteria. Acute means that youve had symptoms for less than 12 weeks.  Understanding your sinuses  The nasal cavity is the large air-filled space behind your nose. The sinuses are a group of spaces formed by the bones of your face. They connect with your nasal cavity. ABRS causes the tissue lining these spaces to become inflamed. Mucus may not drain normally. This leads to facial pain and other symptoms.  What causes ABRS?  ABRS most often follows an upper respiratory infection caused by a virus. Bacteria then infect the lining of your nasal cavity and sinuses. But you can also get ABRS if you have:  · Nasal allergies  · Long-term nasal swelling and congestion not caused by allergies  · Blockage in the nose  Symptoms of ABRS  The symptoms of ABRS may be different for each person, and can include:  · Nasal congestion  · Runny nose  · Fluid draining from the nose down the throat (postnasal drip)  · Headache  · Cough  · Pain in the sinuses  · Thick, colored fluid from the nose (mucus)  · Fever  Diagnosing ABRS  ABRS may be diagnosed if youve had an upper respiratory infection like a cold and cough for longer than 10 to 14 days. Your health care provider will ask about your symptoms and your medical history. The provider will check your vital signs, including your temperature. Youll have a physical exam. The health care provider will check your ears, nose, and throat. You likely wont need any tests. If ABRS comes back, you may have a culture or other tests.  Treatment for ABRS  Treatment may include:  · Antibiotic medicine. This is for symptoms that last for at least 10 to 14 days.  · Nasal corticosteroid medicine. Drops or spray used in the nose can lessen swelling and congestion.  · Over-the-counter pain medicine. This is to lessen sinus pain and pressure.  · Nasal  decongestant medicine. Spray or drops may help to lessen congestion. Do not use them for more than a few days.  · Salt wash (saline irrigation). This can help to loosen mucus.  Possible complications of ABRS  ABRS may come back or become long-term (chronic).  In rare cases, ABRS may cause complications such as:   · Inflamed tissue around the brain and spinal cord (meningitis)  · Inflamed tissue around the eyes (orbital cellulitis)  · Inflamed bones around the sinuses (osteitis)  These problems may need to be treated in a hospital with intravenous (IV) antibiotic medicine or surgery.  When to call the health care provider  Call your health care provider if you have any of the following:  · Symptoms that dont get better, or get worse  · Symptoms that dont get better after 3 to 5 days on antibiotics  · Trouble seeing  · Swelling around your eyes  · Confusion or trouble staying awake   Date Last Reviewed: 3/3/2015  © 2721-9805 [employer]. [address] [place] [address]. All rights reserved. This information is not intended as a substitute for professional medical care. Always follow your healthcare professional's instructions.    Please return here or go to the Emergency Department for any concerns or worsening of condition.  If you were prescribed antibiotics, please take them to completion.  If you were prescribed a narcotic medication, do not drive or operate heavy equipment or machinery while taking these medications.  Please follow up with your primary care doctor or specialist as needed.    If you  smoke, please stop smoking.    1) Motrin/advil/ibuprofen- Take Two to Three Tablets(200 mg) three Times a Day for 5 to 7 Days.  2) Mucinex D 1/2 to 1 Tablet twice a day for 5 to 7 Days.  3) Drink Hot Liquids(coffee,WATER,Tea,Hot Chocolate,or Soup) that you put in a Mug place in Microwave for 2.5 to 3 minutes CHANGE THE CUP THAT WAS USED IN THE MICROWAVE SO AS NOT TO BURN YOUR MOUTH,then sniff  the steam from the cup and sip the heated liquid TEN TO TWELVE TIMES A DAY for 5 to 7 Days.  4) These 3 things will help the antibiotics and other medications work faster and will speed your recovery!

## 2020-11-11 NOTE — PROGRESS NOTES
Subjective:       Patient ID: Lizbeth Barbosa is a 25 y.o. female.    Vitals:  height is 5' (1.524 m) and weight is 77.1 kg (170 lb). Her tympanic temperature is 98.1 °F (36.7 °C). Her blood pressure is 116/85 and her pulse is 116 (abnormal). Her oxygen saturation is 98%.     Chief Complaint: Generalized Body Aches    Other  This is a new problem. The current episode started 1 to 4 weeks ago. The problem occurs constantly. The problem has been gradually worsening. Associated symptoms include congestion, coughing, headaches, myalgias and nausea. Pertinent negatives include no chills, diaphoresis, fatigue, fever, rash, sore throat or vomiting. Nothing aggravates the symptoms. She has tried nothing for the symptoms. The treatment provided no relief.       Constitution: Negative for chills, sweating, fatigue and fever.   HENT: Positive for congestion. Negative for ear pain, sinus pain, sinus pressure, sore throat and voice change.    Neck: Negative for painful lymph nodes.   Eyes: Negative for eye redness.   Respiratory: Positive for cough. Negative for chest tightness, sputum production, bloody sputum, COPD, shortness of breath, stridor, wheezing and asthma.    Gastrointestinal: Positive for nausea. Negative for vomiting.   Musculoskeletal: Positive for pain and muscle ache.   Skin: Negative for rash.   Allergic/Immunologic: Negative for seasonal allergies and asthma.   Neurological: Positive for headaches.   Hematologic/Lymphatic: Negative for swollen lymph nodes.       Objective:      Physical Exam   Constitutional: She is oriented to person, place, and time. She appears well-developed. She is cooperative.  Non-toxic appearance. She does not appear ill. No distress.   HENT:   Head: Normocephalic and atraumatic.   Ears:   Right Ear: Hearing, external ear and ear canal normal. Tympanic membrane is injected and erythematous.   Left Ear: Hearing, external ear and ear canal normal. Tympanic membrane is injected and  erythematous.   Nose: Mucosal edema, rhinorrhea and purulent discharge present. No nasal deformity. No epistaxis. Right sinus exhibits frontal sinus tenderness. Right sinus exhibits no maxillary sinus tenderness. Left sinus exhibits frontal sinus tenderness. Left sinus exhibits no maxillary sinus tenderness.   Mouth/Throat: Uvula is midline and mucous membranes are normal. No trismus in the jaw. Normal dentition. No uvula swelling. Posterior oropharyngeal erythema present. No oropharyngeal exudate or posterior oropharyngeal edema.       Eyes: Conjunctivae and lids are normal. No scleral icterus.   Neck: Trachea normal, full passive range of motion without pain and phonation normal. Neck supple. No neck rigidity. No edema and no erythema present.   Cardiovascular: Normal rate, regular rhythm, S1 normal, S2 normal, normal heart sounds and normal pulses.   No murmur heard.  Pulmonary/Chest: Effort normal and breath sounds normal. No accessory muscle usage. No respiratory distress. She has no decreased breath sounds. She has no wheezes. She has no rhonchi. She has no rales.   Abdominal: Normal appearance.   Musculoskeletal: Normal range of motion.         General: No deformity.   Neurological: She is alert and oriented to person, place, and time. She exhibits normal muscle tone. Coordination normal.   Skin: Skin is warm, dry, intact, not diaphoretic and not pale. Psychiatric: Her speech is normal and behavior is normal. Judgment and thought content normal.   Nursing note and vitals reviewed.        Assessment:       1. Acute bacterial sinusitis    2. Bilateral acute serous otitis media, recurrence not specified        Plan:         Acute bacterial sinusitis  -     predniSONE (DELTASONE) 10 MG tablet; Take 1 tablet (10 mg total) by mouth once daily. for 5 doses  Dispense: 5 tablet; Refill: 0  -     azithromycin (ZITHROMAX) 250 MG tablet; Take 1 tablet (250 mg total) by mouth once daily. Double first dose for 5 doses   Dispense: 6 tablet; Refill: 0    Bilateral acute serous otitis media, recurrence not specified  -     predniSONE (DELTASONE) 10 MG tablet; Take 1 tablet (10 mg total) by mouth once daily. for 5 doses  Dispense: 5 tablet; Refill: 0  -     azithromycin (ZITHROMAX) 250 MG tablet; Take 1 tablet (250 mg total) by mouth once daily. Double first dose for 5 doses  Dispense: 6 tablet; Refill: 0    take meds

## 2021-01-08 ENCOUNTER — OFFICE VISIT (OUTPATIENT)
Dept: URGENT CARE | Facility: CLINIC | Age: 26
End: 2021-01-08
Payer: COMMERCIAL

## 2021-01-08 VITALS
BODY MASS INDEX: 33.38 KG/M2 | DIASTOLIC BLOOD PRESSURE: 68 MMHG | HEART RATE: 78 BPM | TEMPERATURE: 98 F | SYSTOLIC BLOOD PRESSURE: 108 MMHG | HEIGHT: 60 IN | WEIGHT: 170 LBS | OXYGEN SATURATION: 98 %

## 2021-01-08 DIAGNOSIS — B34.9 VIRAL SYNDROME: ICD-10-CM

## 2021-01-08 DIAGNOSIS — Z20.822 CLOSE EXPOSURE TO COVID-19 VIRUS: Primary | ICD-10-CM

## 2021-01-08 DIAGNOSIS — R05.9 COUGH: ICD-10-CM

## 2021-01-08 LAB
CTP QC/QA: YES
SARS-COV-2 RDRP RESP QL NAA+PROBE: NEGATIVE

## 2021-01-08 PROCEDURE — 3008F BODY MASS INDEX DOCD: CPT | Mod: CPTII,S$GLB,, | Performed by: NURSE PRACTITIONER

## 2021-01-08 PROCEDURE — U0002 COVID-19 LAB TEST NON-CDC: HCPCS | Mod: QW,S$GLB,, | Performed by: NURSE PRACTITIONER

## 2021-01-08 PROCEDURE — 99213 PR OFFICE/OUTPT VISIT, EST, LEVL III, 20-29 MIN: ICD-10-PCS | Mod: S$GLB,CS,, | Performed by: NURSE PRACTITIONER

## 2021-01-08 PROCEDURE — 1125F AMNT PAIN NOTED PAIN PRSNT: CPT | Mod: S$GLB,,, | Performed by: NURSE PRACTITIONER

## 2021-01-08 PROCEDURE — 3008F PR BODY MASS INDEX (BMI) DOCUMENTED: ICD-10-PCS | Mod: CPTII,S$GLB,, | Performed by: NURSE PRACTITIONER

## 2021-01-08 PROCEDURE — U0002: ICD-10-PCS | Mod: QW,S$GLB,, | Performed by: NURSE PRACTITIONER

## 2021-01-08 PROCEDURE — 99213 OFFICE O/P EST LOW 20 MIN: CPT | Mod: S$GLB,CS,, | Performed by: NURSE PRACTITIONER

## 2021-01-08 PROCEDURE — 1125F PR PAIN SEVERITY QUANTIFIED, PAIN PRESENT: ICD-10-PCS | Mod: S$GLB,,, | Performed by: NURSE PRACTITIONER

## 2021-01-19 ENCOUNTER — OFFICE VISIT (OUTPATIENT)
Dept: URGENT CARE | Facility: CLINIC | Age: 26
End: 2021-01-19
Payer: COMMERCIAL

## 2021-01-19 VITALS
TEMPERATURE: 98 F | HEIGHT: 60 IN | SYSTOLIC BLOOD PRESSURE: 120 MMHG | OXYGEN SATURATION: 99 % | BODY MASS INDEX: 33.38 KG/M2 | HEART RATE: 72 BPM | DIASTOLIC BLOOD PRESSURE: 81 MMHG | WEIGHT: 170 LBS | RESPIRATION RATE: 16 BRPM

## 2021-01-19 DIAGNOSIS — F17.200 SMOKER: ICD-10-CM

## 2021-01-19 DIAGNOSIS — K52.9 GASTROENTERITIS: Primary | ICD-10-CM

## 2021-01-19 PROCEDURE — 3008F BODY MASS INDEX DOCD: CPT | Mod: CPTII,S$GLB,, | Performed by: INTERNAL MEDICINE

## 2021-01-19 PROCEDURE — 99214 PR OFFICE/OUTPT VISIT, EST, LEVL IV, 30-39 MIN: ICD-10-PCS | Mod: S$GLB,,, | Performed by: INTERNAL MEDICINE

## 2021-01-19 PROCEDURE — 3008F PR BODY MASS INDEX (BMI) DOCUMENTED: ICD-10-PCS | Mod: CPTII,S$GLB,, | Performed by: INTERNAL MEDICINE

## 2021-01-19 PROCEDURE — 99214 OFFICE O/P EST MOD 30 MIN: CPT | Mod: S$GLB,,, | Performed by: INTERNAL MEDICINE

## 2021-01-19 RX ORDER — HYDROXYZINE PAMOATE 50 MG/1
50 CAPSULE ORAL EVERY 8 HOURS PRN
Qty: 25 CAPSULE | Refills: 0 | Status: SHIPPED | OUTPATIENT
Start: 2021-01-19 | End: 2022-11-16

## 2022-01-30 ENCOUNTER — HOSPITAL ENCOUNTER (EMERGENCY)
Facility: HOSPITAL | Age: 27
Discharge: HOME | End: 2022-01-30
Attending: STUDENT IN AN ORGANIZED HEALTH CARE EDUCATION/TRAINING PROGRAM
Payer: COMMERCIAL

## 2022-01-30 VITALS
WEIGHT: 182.56 LBS | DIASTOLIC BLOOD PRESSURE: 68 MMHG | RESPIRATION RATE: 16 BRPM | BODY MASS INDEX: 35.65 KG/M2 | SYSTOLIC BLOOD PRESSURE: 128 MMHG | OXYGEN SATURATION: 96 % | HEART RATE: 91 BPM | TEMPERATURE: 98 F

## 2022-01-30 DIAGNOSIS — N75.0 BARTHOLIN CYST: Primary | ICD-10-CM

## 2022-01-30 LAB — B-HCG UR QL: NEGATIVE

## 2022-01-30 PROCEDURE — 99283 EMERGENCY DEPT VISIT LOW MDM: CPT | Mod: 25

## 2022-01-30 PROCEDURE — 81025 URINE PREGNANCY TEST: CPT | Performed by: STUDENT IN AN ORGANIZED HEALTH CARE EDUCATION/TRAINING PROGRAM

## 2022-01-30 PROCEDURE — 25000003 PHARM REV CODE 250: Performed by: NURSE PRACTITIONER

## 2022-01-30 PROCEDURE — 56420 I&D BARTHOLINS GLAND ABSCESS: CPT

## 2022-01-30 RX ORDER — SULFAMETHOXAZOLE AND TRIMETHOPRIM 800; 160 MG/1; MG/1
1 TABLET ORAL 2 TIMES DAILY
Qty: 14 TABLET | Refills: 0 | Status: SHIPPED | OUTPATIENT
Start: 2022-01-30 | End: 2022-02-06

## 2022-01-30 RX ORDER — LIDOCAINE HYDROCHLORIDE 10 MG/ML
5 INJECTION, SOLUTION EPIDURAL; INFILTRATION; INTRACAUDAL; PERINEURAL
Status: COMPLETED | OUTPATIENT
Start: 2022-01-30 | End: 2022-01-30

## 2022-01-30 RX ADMIN — LIDOCAINE HYDROCHLORIDE 50 MG: 10 INJECTION, SOLUTION EPIDURAL; INFILTRATION; INTRACAUDAL; PERINEURAL at 01:01

## 2022-01-30 NOTE — ED PROVIDER NOTES
Encounter Date: 2022       History     Chief Complaint   Patient presents with    Abscess     C/o bartholin's  abscess x 1 week. Patient reports has tried soaking in hot water and self care at home without relief.     Chief complaint:  Abscess to vulva  [age] year female presents to the ED with a abscess to her ball that she describes a Bartholin's gland since that she has previously had.  She reports have spontaneously resolved with home care however she has tried Sitz baths for the last several days with no improvement.  She denies any discharge denies any potential exposure any sexual transmitted disease or pregnancy.        Review of patient's allergies indicates:   Allergen Reactions    Latex, natural rubber Rash     Past Medical History:   Diagnosis Date    ADHD (attention deficit hyperactivity disorder)     Anemia 10/17/2014    Anxiety     Bipolar II disorder with postpartum onset     Headache     History of psychiatric hospitalization     Hx of psychiatric care     Postpartum depression     Psychiatric problem     Psychosis     Sleep difficulties     Therapy      Past Surgical History:   Procedure Laterality Date    adnoids       SECTION      x 1    TONSILLECTOMY      tonsils       Family History   Problem Relation Age of Onset    No Known Problems Mother     Drug abuse Father     ADD / ADHD Sister     Depression Sister     No Known Problems Daughter     ADD / ADHD Sister     No Known Problems Sister     No Known Problems Daughter     Breast cancer Neg Hx     Cancer Neg Hx     Colon cancer Neg Hx     Diabetes Neg Hx     Eclampsia Neg Hx     Hypertension Neg Hx     Miscarriages / Stillbirths Neg Hx      labor Neg Hx     Ovarian cancer Neg Hx     Stroke Neg Hx      Social History     Tobacco Use    Smoking status: Current Every Day Smoker     Packs/day: 1.00     Years: 2.00     Pack years: 2.00     Types: Cigarettes    Smokeless tobacco: Never  Used   Substance Use Topics    Alcohol use: Not Currently     Alcohol/week: 9.0 standard drinks     Types: 9 Glasses of wine per week     Comment: 3x a week    Drug use: No     Review of Systems   Constitutional: Negative.    Respiratory: Negative.    Cardiovascular: Negative.    Genitourinary: Positive for vaginal pain. Negative for hematuria and pelvic pain.   Skin: Positive for wound. Negative for color change, pallor and rash.       Physical Exam     Initial Vitals [22 1326]   BP Pulse Resp Temp SpO2   128/68 91 16 98.3 °F (36.8 °C) 96 %      MAP       --         Physical Exam    Nursing note and vitals reviewed.  Constitutional: She appears well-nourished.   HENT:   Head: Normocephalic and atraumatic.   Cardiovascular: Normal rate, regular rhythm, normal heart sounds and intact distal pulses.   Pulmonary/Chest: Breath sounds normal. She has no wheezes. She has no rhonchi. She has no rales.   Genitourinary:         Skin: Abscess noted.         ED Course   I & D - Incision and Drainage    Date/Time: 2022 1:58 PM  Location procedure was performed: [facility]ERG[facility]  Performed by: Sangita Rodriguez NP  Authorized by: Landon Hayes DO   Consent Done: Yes  Consent: Verbal consent obtained.  Consent given by: patient  Patient understanding: patient states understanding of the procedure being performed  Patient consent: the patient's understanding of the procedure matches consent given  Procedure consent: procedure consent matches procedure scheduled  Patient identity confirmed:   Type: abscess  Body area: anogenital  Location details: Bartholin's gland  Anesthesia: local infiltration    Anesthesia:  Local Anesthetic: lidocaine 1% without epinephrine  Anesthetic total: 3 mL  Scalpel size: 11        Labs Reviewed   PREGNANCY TEST, URINE RAPID    Narrative:     Specimen Source->Urine          Imaging Results    None          Medications   LIDOcaine (PF) 10 mg/ml (1%) injection 50 mg (50 mg  Infiltration Given 1/30/22 1091)     Medical Decision Making:   Differential Diagnosis:   Abscess, vulvar irritation, Bartholin cyst  ED Management:  [age] year female with pain and swelling to the vulvar area presents to be evaluated she does have a 3 cm Bartholin's gland cyst patient I&D performed in the ED hich she tolerated well.  Placed on antibiotics instructed to monitor closely and to follow-up with primary care doctor or OB gyn in the next 3-5 days given return precautions including but not limited to increased pain swelling drainage fever body aches or chills                      Clinical Impression:   Final diagnoses:  [N75.0] Bartholin cyst (Primary)                 Sangita Rodriguez NP  01/30/22 3439

## 2022-11-17 ENCOUNTER — LAB VISIT (OUTPATIENT)
Dept: LAB | Facility: HOSPITAL | Age: 27
End: 2022-11-17
Attending: INTERNAL MEDICINE
Payer: COMMERCIAL

## 2022-11-17 DIAGNOSIS — R73.01 IMPAIRED FASTING GLUCOSE: ICD-10-CM

## 2022-11-17 LAB
ALBUMIN SERPL BCP-MCNC: 4 G/DL (ref 3.5–5.2)
ALP SERPL-CCNC: 93 U/L (ref 55–135)
ALT SERPL W/O P-5'-P-CCNC: 35 U/L (ref 10–44)
ANION GAP SERPL CALC-SCNC: 3 MMOL/L (ref 8–16)
AST SERPL-CCNC: 12 U/L (ref 10–40)
BASOPHILS # BLD AUTO: 0.04 K/UL (ref 0–0.2)
BASOPHILS NFR BLD: 0.5 % (ref 0–1.9)
BILIRUB SERPL-MCNC: 0.3 MG/DL (ref 0.1–1)
BUN SERPL-MCNC: 13 MG/DL (ref 6–20)
CALCIUM SERPL-MCNC: 8.5 MG/DL (ref 8.7–10.5)
CHLORIDE SERPL-SCNC: 110 MMOL/L (ref 95–110)
CHOLEST SERPL-MCNC: 167 MG/DL (ref 120–199)
CHOLEST/HDLC SERPL: 5.4 {RATIO} (ref 2–5)
CO2 SERPL-SCNC: 25 MMOL/L (ref 23–29)
CREAT SERPL-MCNC: 0.6 MG/DL (ref 0.5–1.4)
DIFFERENTIAL METHOD: NORMAL
EOSINOPHIL # BLD AUTO: 0.1 K/UL (ref 0–0.5)
EOSINOPHIL NFR BLD: 1 % (ref 0–8)
ERYTHROCYTE [DISTWIDTH] IN BLOOD BY AUTOMATED COUNT: 12.3 % (ref 11.5–14.5)
EST. GFR  (NO RACE VARIABLE): >60 ML/MIN/1.73 M^2
GLUCOSE SERPL-MCNC: 92 MG/DL (ref 70–110)
HCT VFR BLD AUTO: 40.6 % (ref 37–48.5)
HDLC SERPL-MCNC: 31 MG/DL (ref 40–75)
HDLC SERPL: 18.6 % (ref 20–50)
HGB BLD-MCNC: 13.3 G/DL (ref 12–16)
IMM GRANULOCYTES # BLD AUTO: 0.02 K/UL (ref 0–0.04)
IMM GRANULOCYTES NFR BLD AUTO: 0.3 % (ref 0–0.5)
LDLC SERPL CALC-MCNC: 103.4 MG/DL (ref 63–159)
LYMPHOCYTES # BLD AUTO: 3.5 K/UL (ref 1–4.8)
LYMPHOCYTES NFR BLD: 43.6 % (ref 18–48)
MCH RBC QN AUTO: 28.6 PG (ref 27–31)
MCHC RBC AUTO-ENTMCNC: 32.8 G/DL (ref 32–36)
MCV RBC AUTO: 87 FL (ref 82–98)
MONOCYTES # BLD AUTO: 0.5 K/UL (ref 0.3–1)
MONOCYTES NFR BLD: 5.9 % (ref 4–15)
NEUTROPHILS # BLD AUTO: 3.9 K/UL (ref 1.8–7.7)
NEUTROPHILS NFR BLD: 48.7 % (ref 38–73)
NONHDLC SERPL-MCNC: 136 MG/DL
NRBC BLD-RTO: 0 /100 WBC
PLATELET # BLD AUTO: 252 K/UL (ref 150–450)
PMV BLD AUTO: 12.2 FL (ref 9.2–12.9)
POTASSIUM SERPL-SCNC: 4 MMOL/L (ref 3.5–5.1)
PROT SERPL-MCNC: 7.9 G/DL (ref 6–8.4)
RBC # BLD AUTO: 4.65 M/UL (ref 4–5.4)
SODIUM SERPL-SCNC: 138 MMOL/L (ref 136–145)
TRIGL SERPL-MCNC: 163 MG/DL (ref 30–150)
TSH SERPL DL<=0.005 MIU/L-ACNC: 0.55 UIU/ML (ref 0.4–4)
WBC # BLD AUTO: 7.92 K/UL (ref 3.9–12.7)

## 2022-11-17 PROCEDURE — 80061 LIPID PANEL: CPT

## 2022-11-17 PROCEDURE — 84403 ASSAY OF TOTAL TESTOSTERONE: CPT

## 2022-11-17 PROCEDURE — 85025 COMPLETE CBC W/AUTO DIFF WBC: CPT

## 2022-11-17 PROCEDURE — 36415 COLL VENOUS BLD VENIPUNCTURE: CPT

## 2022-11-17 PROCEDURE — 83525 ASSAY OF INSULIN: CPT

## 2022-11-17 PROCEDURE — 84443 ASSAY THYROID STIM HORMONE: CPT

## 2022-11-17 PROCEDURE — 80053 COMPREHEN METABOLIC PANEL: CPT

## 2022-11-18 LAB
INSULIN COLLECTION INTERVAL: 0
INSULIN SERPL-ACNC: 8 UU/ML
TESTOST SERPL-MCNC: 17 NG/DL (ref 5–73)

## 2023-02-14 PROBLEM — R73.01 IMPAIRED FASTING GLUCOSE: Status: ACTIVE | Noted: 2023-02-14

## 2023-08-21 PROBLEM — T81.31XA DEHISCENCE OF OPERATIVE WOUND: Status: ACTIVE | Noted: 2023-08-21

## 2023-10-04 ENCOUNTER — PATIENT MESSAGE (OUTPATIENT)
Dept: ADMINISTRATIVE | Facility: OTHER | Age: 28
End: 2023-10-04
Payer: COMMERCIAL

## 2023-11-01 ENCOUNTER — HOSPITAL ENCOUNTER (EMERGENCY)
Facility: HOSPITAL | Age: 28
Discharge: HOME | End: 2023-11-01
Attending: SURGERY
Payer: COMMERCIAL

## 2023-11-01 VITALS
OXYGEN SATURATION: 99 % | HEIGHT: 59 IN | BODY MASS INDEX: 31.02 KG/M2 | DIASTOLIC BLOOD PRESSURE: 82 MMHG | RESPIRATION RATE: 18 BRPM | SYSTOLIC BLOOD PRESSURE: 112 MMHG | WEIGHT: 153.88 LBS | TEMPERATURE: 98 F | HEART RATE: 108 BPM

## 2023-11-01 DIAGNOSIS — N75.1 BARTHOLIN'S GLAND ABSCESS: Primary | ICD-10-CM

## 2023-11-01 PROCEDURE — 99282 EMERGENCY DEPT VISIT SF MDM: CPT | Mod: 25

## 2023-11-01 PROCEDURE — 56420 I&D BARTHOLINS GLAND ABSCESS: CPT

## 2023-11-01 PROCEDURE — 25000003 PHARM REV CODE 250

## 2023-11-01 RX ORDER — LIDOCAINE HYDROCHLORIDE 10 MG/ML
10 INJECTION INFILTRATION; PERINEURAL
Status: COMPLETED | OUTPATIENT
Start: 2023-11-01 | End: 2023-11-01

## 2023-11-01 RX ADMIN — LIDOCAINE HYDROCHLORIDE 10 ML: 10 INJECTION, SOLUTION INFILTRATION; PERINEURAL at 07:11

## 2023-11-01 NOTE — Clinical Note
"Lizbeth Wilcoxgypsy Barbosa was seen and treated in our emergency department on 11/1/2023.  She may return to work on 11/03/2023.       If you have any questions or concerns, please don't hesitate to call.      Luther Brewer, NP"

## 2023-11-02 NOTE — ED PROVIDER NOTES
Encounter Date: 2023       History     Chief Complaint   Patient presents with    Abscess     Pt c/o right sided bartholin cyst x 4 days/      This note is dictated on M*Modal word recognition program.  There are word recognition mistakes and grammatical errors that are occasionally missed on review.     Lizbeth Barbosa is a 28 y.o. female presents to ER today with complaints of Bartholin's gland abscess to right   Vaginal wall. Patient reports she noticed a abscess with pain and tenderness for the last 3 days.  Patient reports she did have a Bartholin's gland abscess to left vaginal wall in the past.  Patient reports she does not currently see a gyn regularly.      The history is provided by the patient.     Review of patient's allergies indicates:   Allergen Reactions    Latex, natural rubber Rash     Past Medical History:   Diagnosis Date    ADHD (attention deficit hyperactivity disorder)     Anemia 10/17/2014    Anxiety     Bipolar II disorder with postpartum onset     Headache     History of psychiatric hospitalization     Hx of psychiatric care     Postpartum depression     Psychiatric problem     Psychosis     Sleep difficulties     Therapy      Past Surgical History:   Procedure Laterality Date    ABDOMINOPLASTY      adnoids      BUTTOCK LIFT       SECTION      x 1    LIPOSUCTION OF THIGH      LIPOSUCTION, UPPER EXTREMITY Bilateral     REDUCTION OF BOTH BREASTS      TONSILLECTOMY      tonsils      WISDOM TOOTH EXTRACTION       Family History   Problem Relation Age of Onset    No Known Problems Mother     Drug abuse Father     ADD / ADHD Sister     Depression Sister     No Known Problems Daughter     ADD / ADHD Sister     No Known Problems Sister     No Known Problems Daughter     Breast cancer Neg Hx     Cancer Neg Hx     Colon cancer Neg Hx     Diabetes Neg Hx     Eclampsia Neg Hx     Hypertension Neg Hx     Miscarriages / Stillbirths Neg Hx      labor Neg Hx      Ovarian cancer Neg Hx     Stroke Neg Hx      Social History     Tobacco Use    Smoking status: Every Day     Current packs/day: 1.00     Average packs/day: 1 pack/day for 2.0 years (2.0 ttl pk-yrs)     Types: Cigarettes    Smokeless tobacco: Never   Substance Use Topics    Alcohol use: Not Currently     Alcohol/week: 9.0 standard drinks of alcohol     Types: 9 Glasses of wine per week     Comment: 3x a week    Drug use: Never     Review of Systems   Constitutional:  Positive for activity change.   Eyes: Negative.    Respiratory: Negative.     Cardiovascular: Negative.    Gastrointestinal: Negative.    Endocrine: Negative.    Genitourinary:  Positive for vaginal pain (Patient reports both on the glans abscess to right vaginal wall).   Skin: Negative.    Allergic/Immunologic: Negative.    Neurological: Negative.    Hematological: Negative.    Psychiatric/Behavioral: Negative.         Physical Exam     Initial Vitals [11/01/23 1917]   BP Pulse Resp Temp SpO2   112/82 108 18 98.3 °F (36.8 °C) 99 %      MAP       --         Physical Exam    Constitutional: She appears well-developed and well-nourished. She is not diaphoretic.   HENT:   Right Ear: External ear normal.   Left Ear: External ear normal.   Eyes: Pupils are equal, round, and reactive to light.   Neck: Neck supple.   Normal range of motion.  Cardiovascular:  Normal rate, regular rhythm and normal heart sounds.           Pulmonary/Chest: Breath sounds normal. No respiratory distress. She has no wheezes. She has no rales.   Abdominal: Abdomen is soft.   Musculoskeletal:      Cervical back: Normal range of motion and neck supple.     Neurological: She is alert and oriented to person, place, and time. GCS score is 15. GCS eye subscore is 4. GCS verbal subscore is 5. GCS motor subscore is 6.   Skin: Capillary refill takes less than 2 seconds. Abscess noted.   Patient has indurated, fluctuant, tender abscess to right Bartholin's gland of vagina.         ED Course   I  & D - Incision and Drainage    Date/Time: 2023 7:53 PM  Location procedure was performed: [facility] EMERGENCY DEPARTMENT    Performed by: Luther Brewer NP  Authorized by: Luther Talley MD  Consent Done: Yes  Consent: Verbal consent obtained.  Consent given by: patient  Patient understanding: patient states understanding of the procedure being performed  Patient identity confirmed: , name and verbally with patient  Type: abscess  Body area: anogenital  Location details: Bartholin's gland  Anesthesia: local infiltration    Anesthesia:  Local Anesthetic: lidocaine 1% without epinephrine  Anesthetic total: 2 mL  Scalpel size: 11  Incision type: single straight  Incision depth: dermal  Complexity: simple  Drainage: pus  Drainage amount: moderate  Wound treatment: incision, drainage, deloculation, expression of material and wound left open  Complications: No  Specimens: No  Implants: No  Patient tolerance: Patient tolerated the procedure well with no immediate complications    Incision depth: dermal        Labs Reviewed - No data to display       Imaging Results    None          Medications   LIDOcaine HCL 10 mg/ml (1%) injection 10 mL (10 mLs Other Given 23)     Medical Decision Making   Differential diagnosis include folliculitis, Bartholin's glans abscess, abscess, cellulitis,     Patient has obvious more times gland abscess.  Incision and drainage perf-ormed -today in ER-- please see procedure note.    Patient instructed on post incision and drainage wound care at home.  Patient instructed to follow-up with gyn provider within the next 48-72 hours.    Vital signs currently hemodynamically stable.  Patient tolerated procedure without any immediate complications.    Patient stable at time of discharge in no acute distress.  No life-threatening illnesses were found during ER visit today.  Patient was instructed to follow-up with PCP or other recommended specialist within the next 48-72 hours.  Patient  was instructed to return to ER immediately for any worsening or concerning symptoms.  All discharge instructions discussed with patient, and patient agrees to comply with discharge instructions given today.     Risk  Prescription drug management.                               Clinical Impression:   Final diagnoses:  [N75.1] Bartholin's gland abscess (Primary)        ED Disposition Condition    Discharge Stable          ED Prescriptions    None       Follow-up Information       Follow up With Specialties Details Why Contact Info    Kate Lewis MD Obstetrics and Gynecology Schedule an appointment as soon as possible for a visit in 3 days For wound re-check please follow-up within 24-48 hours with OBGYN please, [facility] wound [facility]check [address] [place] [address]  [phone]               Luther Brewer, NP  11/01/23 1955

## 2023-11-03 DIAGNOSIS — D62 ACUTE BLOOD LOSS ANEMIA: Primary | ICD-10-CM

## 2023-11-03 RX ORDER — FERROUS SULFATE 325(65) MG
325 TABLET ORAL
Qty: 60 TABLET | Refills: 0 | Status: SHIPPED | OUTPATIENT
Start: 2023-11-03

## 2024-02-07 ENCOUNTER — LAB VISIT (OUTPATIENT)
Dept: LAB | Facility: HOSPITAL | Age: 29
End: 2024-02-07
Attending: INTERNAL MEDICINE
Payer: COMMERCIAL

## 2024-02-07 DIAGNOSIS — Z00.00 WELLNESS EXAMINATION: ICD-10-CM

## 2024-02-07 DIAGNOSIS — Z13.220 LIPID SCREENING: ICD-10-CM

## 2024-02-07 DIAGNOSIS — Z13.1 DIABETES MELLITUS SCREENING: ICD-10-CM

## 2024-02-07 DIAGNOSIS — Z13.21 ENCOUNTER FOR VITAMIN DEFICIENCY SCREENING: ICD-10-CM

## 2024-02-07 DIAGNOSIS — Z13.0 SCREENING FOR DEFICIENCY ANEMIA: ICD-10-CM

## 2024-02-07 DIAGNOSIS — Z11.59 NEED FOR HEPATITIS C SCREENING TEST: ICD-10-CM

## 2024-02-07 DIAGNOSIS — Z13.29 SCREENING FOR THYROID DISORDER: ICD-10-CM

## 2024-02-07 PROBLEM — R10.13 EPIGASTRIC PAIN: Status: ACTIVE | Noted: 2024-02-07

## 2024-02-07 LAB
ALBUMIN SERPL BCP-MCNC: 3.9 G/DL (ref 3.5–5.2)
ALP SERPL-CCNC: 72 U/L (ref 55–135)
ALT SERPL W/O P-5'-P-CCNC: 26 U/L (ref 10–44)
AMYLASE SERPL-CCNC: 63 U/L (ref 20–110)
ANION GAP SERPL CALC-SCNC: 3 MMOL/L (ref 3–11)
AST SERPL-CCNC: 12 U/L (ref 10–40)
BASOPHILS # BLD AUTO: 0.05 K/UL (ref 0–0.2)
BASOPHILS NFR BLD: 0.7 % (ref 0–1.9)
BILIRUB SERPL-MCNC: 0.9 MG/DL (ref 0.1–1)
BUN SERPL-MCNC: 15 MG/DL (ref 6–20)
CALCIUM SERPL-MCNC: 9 MG/DL (ref 8.7–10.5)
CHLORIDE SERPL-SCNC: 112 MMOL/L (ref 95–110)
CHOLEST SERPL-MCNC: 146 MG/DL (ref 120–199)
CHOLEST/HDLC SERPL: 4.3 {RATIO} (ref 2–5)
CO2 SERPL-SCNC: 25 MMOL/L (ref 23–29)
CREAT SERPL-MCNC: 0.6 MG/DL (ref 0.5–1.4)
DIFFERENTIAL METHOD BLD: ABNORMAL
EOSINOPHIL # BLD AUTO: 0.1 K/UL (ref 0–0.5)
EOSINOPHIL NFR BLD: 0.7 % (ref 0–8)
ERYTHROCYTE [DISTWIDTH] IN BLOOD BY AUTOMATED COUNT: 14.4 % (ref 11.5–14.5)
EST. GFR  (NO RACE VARIABLE): >60 ML/MIN/1.73 M^2
ESTIMATED AVG GLUCOSE: 97 MG/DL (ref 68–131)
GLUCOSE SERPL-MCNC: 80 MG/DL (ref 70–110)
HBA1C MFR BLD: 5 % (ref 4–5.6)
HCT VFR BLD AUTO: 39.8 % (ref 37–48.5)
HDLC SERPL-MCNC: 34 MG/DL (ref 40–75)
HDLC SERPL: 23.3 % (ref 20–50)
HGB BLD-MCNC: 12.4 G/DL (ref 12–16)
IMM GRANULOCYTES # BLD AUTO: 0.03 K/UL (ref 0–0.04)
IMM GRANULOCYTES NFR BLD AUTO: 0.4 % (ref 0–0.5)
LDLC SERPL CALC-MCNC: 97.8 MG/DL (ref 63–159)
LIPASE SERPL-CCNC: 30 U/L (ref 13–75)
LYMPHOCYTES # BLD AUTO: 2.9 K/UL (ref 1–4.8)
LYMPHOCYTES NFR BLD: 40.2 % (ref 18–48)
MCH RBC QN AUTO: 27.3 PG (ref 27–31)
MCHC RBC AUTO-ENTMCNC: 31.2 G/DL (ref 32–36)
MCV RBC AUTO: 88 FL (ref 82–98)
MONOCYTES # BLD AUTO: 0.4 K/UL (ref 0.3–1)
MONOCYTES NFR BLD: 5 % (ref 4–15)
NEUTROPHILS # BLD AUTO: 3.8 K/UL (ref 1.8–7.7)
NEUTROPHILS NFR BLD: 53 % (ref 38–73)
NONHDLC SERPL-MCNC: 112 MG/DL
NRBC BLD-RTO: 0 /100 WBC
PLATELET # BLD AUTO: 255 K/UL (ref 150–450)
PMV BLD AUTO: 11.5 FL (ref 9.2–12.9)
POTASSIUM SERPL-SCNC: 4.1 MMOL/L (ref 3.5–5.1)
PROT SERPL-MCNC: 7.4 G/DL (ref 6–8.4)
RBC # BLD AUTO: 4.54 M/UL (ref 4–5.4)
SODIUM SERPL-SCNC: 140 MMOL/L (ref 136–145)
TRIGL SERPL-MCNC: 71 MG/DL (ref 30–150)
TSH SERPL DL<=0.005 MIU/L-ACNC: 0.41 UIU/ML (ref 0.4–4)
WBC # BLD AUTO: 7.16 K/UL (ref 3.9–12.7)

## 2024-02-07 PROCEDURE — 80053 COMPREHEN METABOLIC PANEL: CPT | Performed by: INTERNAL MEDICINE

## 2024-02-07 PROCEDURE — 83690 ASSAY OF LIPASE: CPT | Performed by: INTERNAL MEDICINE

## 2024-02-07 PROCEDURE — 82150 ASSAY OF AMYLASE: CPT | Performed by: INTERNAL MEDICINE

## 2024-02-07 PROCEDURE — 36415 COLL VENOUS BLD VENIPUNCTURE: CPT | Performed by: INTERNAL MEDICINE

## 2024-02-07 PROCEDURE — 86803 HEPATITIS C AB TEST: CPT | Performed by: INTERNAL MEDICINE

## 2024-02-07 PROCEDURE — 85025 COMPLETE CBC W/AUTO DIFF WBC: CPT | Performed by: INTERNAL MEDICINE

## 2024-02-07 PROCEDURE — 84443 ASSAY THYROID STIM HORMONE: CPT | Performed by: INTERNAL MEDICINE

## 2024-02-07 PROCEDURE — 83036 HEMOGLOBIN GLYCOSYLATED A1C: CPT | Performed by: INTERNAL MEDICINE

## 2024-02-07 PROCEDURE — 80061 LIPID PANEL: CPT | Performed by: INTERNAL MEDICINE

## 2024-02-08 LAB — HCV AB SERPL QL IA: NORMAL

## 2024-04-19 ENCOUNTER — HOSPITAL ENCOUNTER (EMERGENCY)
Facility: HOSPITAL | Age: 29
Discharge: HOME | End: 2024-04-19
Attending: EMERGENCY MEDICINE
Payer: COMMERCIAL

## 2024-04-19 VITALS
TEMPERATURE: 99 F | OXYGEN SATURATION: 98 % | SYSTOLIC BLOOD PRESSURE: 153 MMHG | WEIGHT: 158.81 LBS | BODY MASS INDEX: 31.02 KG/M2 | HEART RATE: 109 BPM | DIASTOLIC BLOOD PRESSURE: 65 MMHG | RESPIRATION RATE: 18 BRPM

## 2024-04-19 DIAGNOSIS — S70.12XA HEMATOMA OF LEFT THIGH, INITIAL ENCOUNTER: Primary | ICD-10-CM

## 2024-04-19 DIAGNOSIS — R22.40: ICD-10-CM

## 2024-04-19 DIAGNOSIS — Z76.89 ENCOUNTER FOR INCISION AND DRAINAGE PROCEDURE: ICD-10-CM

## 2024-04-19 LAB — B-HCG UR QL: NEGATIVE

## 2024-04-19 PROCEDURE — 25000003 PHARM REV CODE 250

## 2024-04-19 PROCEDURE — 10140 I&D HMTMA SEROMA/FLUID COLLJ: CPT

## 2024-04-19 PROCEDURE — 81025 URINE PREGNANCY TEST: CPT | Performed by: EMERGENCY MEDICINE

## 2024-04-19 PROCEDURE — 99284 EMERGENCY DEPT VISIT MOD MDM: CPT | Mod: 25

## 2024-04-19 RX ORDER — LIDOCAINE HYDROCHLORIDE 10 MG/ML
10 INJECTION INFILTRATION; PERINEURAL
Status: COMPLETED | OUTPATIENT
Start: 2024-04-19 | End: 2024-04-19

## 2024-04-19 RX ADMIN — LIDOCAINE HYDROCHLORIDE 10 ML: 10 INJECTION, SOLUTION INFILTRATION; PERINEURAL at 03:04

## 2024-04-19 NOTE — ED PROVIDER NOTES
Encounter Date: 2024       History     Chief Complaint   Patient presents with    Leg Swelling     Patient to ER CC of upper left leg pain, reports a large lump to her inner upper left leg      This note is dictated on M*Modal word recognition program.  There are word recognition mistakes and grammatical errors that are occasionally missed on review.     Lizbeth Barbosa is a 28 y.o. female presents to ER today with complaints of lump to left inner thigh.  Patient reports this lump has been present about the size of a grape since liposuction last year in 2023.  Patient reports over the last 24 hours the lump has grown to the size of a tennis ball and  is now painful and red and tender to palpation.      The history is provided by the patient.     Review of patient's allergies indicates:   Allergen Reactions    Latex, natural rubber Rash     Past Medical History:   Diagnosis Date    ADHD (attention deficit hyperactivity disorder)     Anemia 10/17/2014    Anxiety     Bipolar II disorder with postpartum onset     Headache     History of psychiatric hospitalization     Hx of psychiatric care     Postpartum depression     Psychiatric problem     Psychosis     Sleep difficulties     Therapy     Wellness examination 2024     Past Surgical History:   Procedure Laterality Date    ABDOMINOPLASTY      adnoids      BUTTOCK LIFT       SECTION      x 1    LIPOSUCTION OF THIGH      LIPOSUCTION, UPPER EXTREMITY Bilateral     REDUCTION OF BOTH BREASTS      TONSILLECTOMY      tonsils      WISDOM TOOTH EXTRACTION       Family History   Problem Relation Name Age of Onset    No Known Problems Mother      Drug abuse Father      ADD / ADHD Sister      Depression Sister      No Known Problems Daughter      ADD / ADHD Sister      No Known Problems Sister      No Known Problems Daughter      Breast cancer Neg Hx      Cancer Neg Hx      Colon cancer Neg Hx      Diabetes Neg Hx      Eclampsia Neg Hx       Hypertension Neg Hx      Miscarriages / Stillbirths Neg Hx       labor Neg Hx      Ovarian cancer Neg Hx      Stroke Neg Hx       Social History     Tobacco Use    Smoking status: Every Day     Current packs/day: 1.00     Average packs/day: 1 pack/day for 2.0 years (2.0 ttl pk-yrs)     Types: Cigarettes    Smokeless tobacco: Never   Substance Use Topics    Alcohol use: Not Currently     Alcohol/week: 9.0 standard drinks of alcohol     Types: 9 Glasses of wine per week     Comment: 3x a week    Drug use: Never     Review of Systems   Skin:  Positive for color change and wound.   All other systems reviewed and are negative.      Physical Exam     Initial Vitals [24 1232]   BP Pulse Resp Temp SpO2   (!) 153/65 109 18 98.7 °F (37.1 °C) 98 %      MAP       --         Physical Exam    Constitutional: She appears well-developed and well-nourished. She is not diaphoretic. No distress.   HENT:   Right Ear: External ear normal.   Left Ear: External ear normal.   Eyes: Pupils are equal, round, and reactive to light. Right eye exhibits no discharge. Left eye exhibits no discharge.   Neck: Neck supple.   Normal range of motion.  Cardiovascular:  Normal rate, regular rhythm and normal heart sounds.           Pulmonary/Chest: Breath sounds normal. No respiratory distress. She has no wheezes. She has no rhonchi. She has no rales.   Abdominal: Abdomen is soft.   Musculoskeletal:         General: No tenderness or edema.      Cervical back: Normal range of motion and neck supple.     Neurological: She is alert and oriented to person, place, and time. GCS score is 15. GCS eye subscore is 4. GCS verbal subscore is 5. GCS motor subscore is 6.   Skin: Capillary refill takes less than 2 seconds. There is erythema.   Patient has a lump to left inner thigh that is erythemic, tender to palpation there is some fluctuance.   Psychiatric: She has a normal mood and affect. Her behavior is normal. Thought content normal.         ED  Course   I & D - Incision and Drainage    Date/Time: 4/19/2024 3:56 PM  Location procedure was performed: [facility] EMERGENCY DEPARTMENT    Performed by: Luther Brewer NP  Authorized by: Wanda Coats MD  Type: hematoma  Anesthesia: local infiltration    Anesthesia:  Local Anesthetic: lidocaine 1% without epinephrine  Anesthetic total: 4 mL  Scalpel size: 11  Incision type: single straight  Incision depth: dermal  Complexity: simple  Drainage: bloody and serous  Drainage amount: moderate  Wound treatment: incision, drainage, wound left open and expression of material  Complications: No  Estimated blood loss (mL): 5  Specimens: No  Implants: No  Patient tolerance: Patient tolerated the procedure well with no immediate complications    Incision depth: dermal        Labs Reviewed   PREGNANCY TEST, URINE RAPID    Narrative:     Specimen Source->Urine          Imaging Results              US Soft Tissue, Lower Extremity, Left (Final result)  Result time 04/19/24 15:23:50      Final result by Wade Montes Jr., MD (04/19/24 15:23:50)                   Impression:      Large organizing hematoma in the medial left thigh.      Electronically signed by: Wade Montes MD  Date:    04/19/2024  Time:    15:23               Narrative:    EXAMINATION:  US SOFT TISSUE, LOWER EXTREMITY, LEFT    CLINICAL HISTORY:  left thigh lump;    TECHNIQUE:  Sagittal and transverse AA images are obtained through the medial left thigh in the area of concern.  Comparison images are obtained on the right.    COMPARISON:  None    FINDINGS:  In the area of palpable lesion is a large anechoic fluid collection with surrounding irregular tissue.  It measures 4.3 x 6.4 cm.  The finding is consistent with a organizing hematoma.  Vascular flow within the mass is not seen.  A similar finding is not seen in the right thigh.                                       Medications   LIDOcaine HCL 10 mg/ml (1%) injection 10 mL (10 mLs Other Given by Other  4/19/24 6623)     Medical Decision Making  Differential diagnosis include abscess, lipoma, hematoma, skin lump    Ultrasound of soft tissue left thigh has the following impression--  Large organizing hematoma in the medial left thigh.    Incision and drainage was performed to hematoma of left thigh, bloody drainage was obtained.  No purulent drainage.  Home bandage changes and wound care discussed with patient.  Patient instructed to follow-up with PCP/Dermatology for further evaluation treatment of hematoma left thigh.  Patient stable at time of discharge in no acute distress.  No life-threatening illnesses were found during ER visit today.  Patient was instructed to follow-up with PCP or other recommended specialist within the next 48-72 hours.  Patient was instructed to return to ER immediately for any worsening or concerning symptoms.  All discharge instructions discussed with patient, and patient agrees to comply with discharge instructions given today.         Amount and/or Complexity of Data Reviewed  Radiology: ordered.    Risk  Prescription drug management.                                      Clinical Impression:  Final diagnoses:  [R22.40] Skin lump of leg  [S70.12XA] Hematoma of left thigh, initial encounter (Primary)  [Z76.89] Encounter for incision and drainage procedure          ED Disposition Condition    Discharge Stable          ED Prescriptions    None       Follow-up Information       Follow up With Specialties Details Why Contact Info    Tiff Castro MD Internal Medicine Schedule an appointment as soon as possible for a visit in 3 days  [address] [place] [id number]  [phone]               Luther Brewer, NP  04/19/24 7238

## 2024-04-19 NOTE — Clinical Note
"Lizbeth Muñoz" Chelsey was seen and treated in our emergency department on 4/19/2024.  She may return to work on 04/22/2024.       If you have any questions or concerns, please don't hesitate to call.       RN    "

## 2024-04-30 ENCOUNTER — OFFICE VISIT (OUTPATIENT)
Dept: SURGERY | Facility: CLINIC | Age: 29
End: 2024-04-30
Payer: COMMERCIAL

## 2024-04-30 VITALS
DIASTOLIC BLOOD PRESSURE: 68 MMHG | HEART RATE: 88 BPM | SYSTOLIC BLOOD PRESSURE: 101 MMHG | WEIGHT: 158.31 LBS | TEMPERATURE: 99 F | BODY MASS INDEX: 31.08 KG/M2 | HEIGHT: 60 IN | OXYGEN SATURATION: 98 %

## 2024-04-30 DIAGNOSIS — T14.8XXA: ICD-10-CM

## 2024-04-30 DIAGNOSIS — T79.2XXS: Primary | ICD-10-CM

## 2024-04-30 DIAGNOSIS — R22.40 MASS OF LOWER EXTREMITY, UNSPECIFIED LATERALITY: ICD-10-CM

## 2024-04-30 PROCEDURE — 3074F SYST BP LT 130 MM HG: CPT | Mod: CPTII,S$GLB,, | Performed by: STUDENT IN AN ORGANIZED HEALTH CARE EDUCATION/TRAINING PROGRAM

## 2024-04-30 PROCEDURE — 3044F HG A1C LEVEL LT 7.0%: CPT | Mod: CPTII,S$GLB,, | Performed by: STUDENT IN AN ORGANIZED HEALTH CARE EDUCATION/TRAINING PROGRAM

## 2024-04-30 PROCEDURE — 99203 OFFICE O/P NEW LOW 30 MIN: CPT | Mod: S$GLB,,, | Performed by: STUDENT IN AN ORGANIZED HEALTH CARE EDUCATION/TRAINING PROGRAM

## 2024-04-30 PROCEDURE — 3078F DIAST BP <80 MM HG: CPT | Mod: CPTII,S$GLB,, | Performed by: STUDENT IN AN ORGANIZED HEALTH CARE EDUCATION/TRAINING PROGRAM

## 2024-04-30 PROCEDURE — 1159F MED LIST DOCD IN RCRD: CPT | Mod: CPTII,S$GLB,, | Performed by: STUDENT IN AN ORGANIZED HEALTH CARE EDUCATION/TRAINING PROGRAM

## 2024-04-30 PROCEDURE — 99999 PR PBB SHADOW E&M-EST. PATIENT-LVL IV: CPT | Mod: PBBFAC,,, | Performed by: STUDENT IN AN ORGANIZED HEALTH CARE EDUCATION/TRAINING PROGRAM

## 2024-04-30 PROCEDURE — 3008F BODY MASS INDEX DOCD: CPT | Mod: CPTII,S$GLB,, | Performed by: STUDENT IN AN ORGANIZED HEALTH CARE EDUCATION/TRAINING PROGRAM

## 2024-05-06 NOTE — H&P
[facility] H&P      Consult: persistent fluid collection of left thigh  Consulting Service: ED   Chief Complaint: thigh mass     HPI: Pt is a 28 y.o. female who presents with recurrent fluid collection of the left medial thigh after liposuction.  Procedure 8 months ago.  Noticed grape-sized fluid collection right after procedure but it grew in size rapidly after she stopped wearing compression garmets two month ago.  Has undergone 3 I&Ds over the past month.  Currently wrapping area with ACE.     PMH:   Past Medical History:   Diagnosis Date    ADHD (attention deficit hyperactivity disorder)     Anemia 10/17/2014    Anxiety     Bipolar II disorder with postpartum onset     Headache     History of psychiatric hospitalization     Hx of psychiatric care     Postpartum depression     Psychiatric problem     Psychosis     Sleep difficulties     Therapy     Wellness examination 2024     PSH:   Past Surgical History:   Procedure Laterality Date    ABDOMINOPLASTY      adnoids      BUTTOCK LIFT       SECTION      x 1    LIPOSUCTION OF THIGH      LIPOSUCTION, UPPER EXTREMITY Bilateral     REDUCTION OF BOTH BREASTS      TONSILLECTOMY      tonsils      WISDOM TOOTH EXTRACTION       Meds: See medication list;  No anticoagulation  ALL: Latex, natural rubber  FHX: non contributory   SOC:   Social History     Socioeconomic History    Marital status:      Spouse name: Frederick Barbosa    Number of children: 3   Occupational History    Occupation: [employer]   Tobacco Use    Smoking status: Every Day     Current packs/day: 1.00     Average packs/day: 1 pack/day for 2.0 years (2.0 ttl pk-yrs)     Types: Cigarettes    Smokeless tobacco: Never   Substance and Sexual Activity    Alcohol use: Not Currently     Alcohol/week: 9.0 standard drinks of alcohol     Types: 9 Glasses of wine per week     Comment: 3x a week    Drug use: Never    Sexual activity: Yes     Partners: Male      Birth control/protection: None   Other Topics Concern    Patient feels they ought to cut down on drinking/drug use Yes    Patient annoyed by others criticizing their drinking/drug use No    Patient has felt bad or guilty about drinking/drug use Yes    Patient has had a drink/used drugs as an eye opener in the AM Yes     ROS: Review of Systems   Constitutional:  Negative for chills, fever, malaise/fatigue and weight loss.   Respiratory:  Negative for cough.    Cardiovascular:  Negative for chest pain.   Gastrointestinal:  Negative for abdominal pain, blood in stool, constipation, diarrhea, heartburn, melena, nausea and vomiting.   All other systems reviewed and are negative.      Physical Exam:  /68 (BP Location: Right arm, Patient Position: Sitting, BP Method: Medium (Automatic))   Pulse 88   Temp 99 °F (37.2 °C) (Oral)   Ht 5' (1.524 m)   Wt 71.8 kg (158 lb 4.6 oz)   LMP 04/06/2024 (Exact Date)   SpO2 98%   BMI 30.91 kg/m²   Physical Exam  Constitutional:       General: She is not in acute distress.     Appearance: She is obese. She is not ill-appearing or toxic-appearing.   Cardiovascular:      Rate and Rhythm: Normal rate and regular rhythm.   Pulmonary:      Effort: Pulmonary effort is normal. No respiratory distress.   Abdominal:      General: There is no distension.      Palpations: Abdomen is soft.      Tenderness: There is no abdominal tenderness. There is no guarding or rebound.   Musculoskeletal:      Right lower leg: No edema.      Left lower leg: No edema.      Comments: Firm round subcuticular mass along the left medial thigh with no SOI   Skin:     General: Skin is warm and dry.      Capillary Refill: Capillary refill takes less than 2 seconds.   Neurological:      Mental Status: She is alert. Mental status is at baseline.           Imaging:   US Soft tissue:  Impression:     Large organizing hematoma in the medial left thigh.        A/P: Pt is a 28 y.o. female who presents with recurrent  fluid collection along the left medial thigh  -Suspected lymphocele vs inadequately drained seroma   -NM Lymph mapping   -Continue compression wrap and garments   -Return precautions given   -RTC after imaging      Annie Grier MD  [phone]

## 2024-05-08 ENCOUNTER — PATIENT MESSAGE (OUTPATIENT)
Dept: SURGERY | Facility: CLINIC | Age: 29
End: 2024-05-08
Payer: COMMERCIAL

## 2024-05-13 PROBLEM — Z00.00 WELLNESS EXAMINATION: Status: RESOLVED | Noted: 2024-02-07 | Resolved: 2024-05-13

## 2024-07-01 ENCOUNTER — APPOINTMENT (OUTPATIENT)
Dept: LAB | Facility: HOSPITAL | Age: 29
End: 2024-07-01
Attending: INTERNAL MEDICINE
Payer: COMMERCIAL

## 2024-07-01 PROBLEM — F31.81: Chronic | Status: RESOLVED | Noted: 2018-01-26 | Resolved: 2024-07-01

## 2024-07-01 PROBLEM — T81.31XA DEHISCENCE OF OPERATIVE WOUND: Status: RESOLVED | Noted: 2023-08-21 | Resolved: 2024-07-01

## 2024-07-01 PROBLEM — R73.01 IMPAIRED FASTING GLUCOSE: Status: RESOLVED | Noted: 2023-02-14 | Resolved: 2024-07-01

## 2024-07-01 PROBLEM — Z01.818 PREOPERATIVE CLEARANCE: Status: ACTIVE | Noted: 2024-07-01

## 2024-07-08 ENCOUNTER — LAB VISIT (OUTPATIENT)
Dept: LAB | Facility: HOSPITAL | Age: 29
End: 2024-07-08
Attending: INTERNAL MEDICINE
Payer: COMMERCIAL

## 2024-07-08 DIAGNOSIS — Z01.818 PRE-OP EXAMINATION: ICD-10-CM

## 2024-07-08 LAB
BASOPHILS # BLD AUTO: 0.04 K/UL (ref 0–0.2)
BASOPHILS NFR BLD: 0.4 % (ref 0–1.9)
DIFFERENTIAL METHOD BLD: NORMAL
EOSINOPHIL # BLD AUTO: 0.1 K/UL (ref 0–0.5)
EOSINOPHIL NFR BLD: 0.6 % (ref 0–8)
ERYTHROCYTE [DISTWIDTH] IN BLOOD BY AUTOMATED COUNT: 13.4 % (ref 11.5–14.5)
HCT VFR BLD AUTO: 37 % (ref 37–48.5)
HGB BLD-MCNC: 12.1 G/DL (ref 12–16)
IMM GRANULOCYTES # BLD AUTO: 0.03 K/UL (ref 0–0.04)
IMM GRANULOCYTES NFR BLD AUTO: 0.3 % (ref 0–0.5)
LYMPHOCYTES # BLD AUTO: 3.6 K/UL (ref 1–4.8)
LYMPHOCYTES NFR BLD: 36.4 % (ref 18–48)
MCH RBC QN AUTO: 28.4 PG (ref 27–31)
MCHC RBC AUTO-ENTMCNC: 32.7 G/DL (ref 32–36)
MCV RBC AUTO: 87 FL (ref 82–98)
MONOCYTES # BLD AUTO: 0.4 K/UL (ref 0.3–1)
MONOCYTES NFR BLD: 4.3 % (ref 4–15)
NEUTROPHILS # BLD AUTO: 5.7 K/UL (ref 1.8–7.7)
NEUTROPHILS NFR BLD: 58 % (ref 38–73)
NRBC BLD-RTO: 0 /100 WBC
PLATELET # BLD AUTO: 256 K/UL (ref 150–450)
PMV BLD AUTO: 11 FL (ref 9.2–12.9)
RBC # BLD AUTO: 4.26 M/UL (ref 4–5.4)
WBC # BLD AUTO: 9.76 K/UL (ref 3.9–12.7)

## 2024-07-08 PROCEDURE — 36415 COLL VENOUS BLD VENIPUNCTURE: CPT | Performed by: INTERNAL MEDICINE

## 2024-07-08 PROCEDURE — 85025 COMPLETE CBC W/AUTO DIFF WBC: CPT | Performed by: INTERNAL MEDICINE

## 2024-10-21 ENCOUNTER — PATIENT MESSAGE (OUTPATIENT)
Dept: FAMILY MEDICINE | Facility: CLINIC | Age: 29
End: 2024-10-21
Payer: COMMERCIAL